# Patient Record
Sex: FEMALE | Race: WHITE | NOT HISPANIC OR LATINO | Employment: OTHER | URBAN - METROPOLITAN AREA
[De-identification: names, ages, dates, MRNs, and addresses within clinical notes are randomized per-mention and may not be internally consistent; named-entity substitution may affect disease eponyms.]

---

## 2017-01-15 ENCOUNTER — HOSPITAL ENCOUNTER (INPATIENT)
Facility: OTHER | Age: 72
LOS: 3 days | Discharge: HOME | DRG: 552 | End: 2017-01-20
Attending: INTERNAL MEDICINE | Admitting: INTERNAL MEDICINE
Payer: MEDICARE

## 2017-01-15 DIAGNOSIS — M47.27 OSTEOARTHRITIS OF SPINE WITH RADICULOPATHY, LUMBOSACRAL REGION: Primary | ICD-10-CM

## 2017-01-15 DIAGNOSIS — I15.2 HYPERTENSION ASSOCIATED WITH DIABETES: ICD-10-CM

## 2017-01-15 DIAGNOSIS — E83.42 HYPOMAGNESEMIA: ICD-10-CM

## 2017-01-15 DIAGNOSIS — R29.898 WEAKNESS OF RIGHT LOWER EXTREMITY: ICD-10-CM

## 2017-01-15 DIAGNOSIS — R53.1 WEAKNESS: ICD-10-CM

## 2017-01-15 DIAGNOSIS — E87.6 HYPOKALEMIA: ICD-10-CM

## 2017-01-15 DIAGNOSIS — E11.9 CONTROLLED TYPE 2 DIABETES MELLITUS WITHOUT COMPLICATION, WITHOUT LONG-TERM CURRENT USE OF INSULIN: ICD-10-CM

## 2017-01-15 DIAGNOSIS — F32.A DEPRESSION, UNSPECIFIED DEPRESSION TYPE: ICD-10-CM

## 2017-01-15 DIAGNOSIS — E11.59 HYPERTENSION ASSOCIATED WITH DIABETES: ICD-10-CM

## 2017-01-15 DIAGNOSIS — E83.39 HYPOPHOSPHATEMIA: ICD-10-CM
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
## 2017-01-15 LAB — POCT GLUCOSE: 114 MG/DL (ref 70–110)

## 2017-01-15 PROCEDURE — 25000003 PHARM REV CODE 250: Performed by: INTERNAL MEDICINE

## 2017-01-15 PROCEDURE — G0378 HOSPITAL OBSERVATION PER HR: HCPCS

## 2017-01-15 PROCEDURE — G0379 DIRECT REFER HOSPITAL OBSERV: HCPCS

## 2017-01-15 PROCEDURE — 63600175 PHARM REV CODE 636 W HCPCS: Performed by: INTERNAL MEDICINE

## 2017-01-15 RX ORDER — DULOXETIN HYDROCHLORIDE 20 MG/1
20 CAPSULE, DELAYED RELEASE ORAL 2 TIMES DAILY
Status: DISCONTINUED | OUTPATIENT
Start: 2017-01-15 | End: 2017-01-20 | Stop reason: HOSPADM

## 2017-01-15 RX ORDER — ONDANSETRON 2 MG/ML
4 INJECTION INTRAMUSCULAR; INTRAVENOUS EVERY 12 HOURS PRN
Status: DISCONTINUED | OUTPATIENT
Start: 2017-01-15 | End: 2017-01-20 | Stop reason: HOSPADM

## 2017-01-15 RX ORDER — HYDROCODONE BITARTRATE AND ACETAMINOPHEN 5; 325 MG/1; MG/1
1 TABLET ORAL EVERY 6 HOURS PRN
Status: DISCONTINUED | OUTPATIENT
Start: 2017-01-15 | End: 2017-01-20 | Stop reason: HOSPADM

## 2017-01-15 RX ORDER — POTASSIUM CHLORIDE 20 MEQ/1
20 TABLET, EXTENDED RELEASE ORAL
Status: COMPLETED | OUTPATIENT
Start: 2017-01-16 | End: 2017-01-16

## 2017-01-15 RX ORDER — SODIUM,POTASSIUM PHOSPHATES 280-250MG
1 POWDER IN PACKET (EA) ORAL
Status: DISCONTINUED | OUTPATIENT
Start: 2017-01-16 | End: 2017-01-18

## 2017-01-15 RX ORDER — GLUCAGON 1 MG
1 KIT INJECTION
Status: DISCONTINUED | OUTPATIENT
Start: 2017-01-15 | End: 2017-01-20 | Stop reason: HOSPADM

## 2017-01-15 RX ORDER — IBUPROFEN 200 MG
16 TABLET ORAL
Status: DISCONTINUED | OUTPATIENT
Start: 2017-01-15 | End: 2017-01-20 | Stop reason: HOSPADM

## 2017-01-15 RX ORDER — PANTOPRAZOLE SODIUM 40 MG/1
40 TABLET, DELAYED RELEASE ORAL DAILY
Status: DISCONTINUED | OUTPATIENT
Start: 2017-01-16 | End: 2017-01-20 | Stop reason: HOSPADM

## 2017-01-15 RX ORDER — INSULIN ASPART 100 [IU]/ML
0-5 INJECTION, SOLUTION INTRAVENOUS; SUBCUTANEOUS
Status: DISCONTINUED | OUTPATIENT
Start: 2017-01-15 | End: 2017-01-20 | Stop reason: HOSPADM

## 2017-01-15 RX ORDER — MAGNESIUM SULFATE HEPTAHYDRATE 40 MG/ML
2 INJECTION, SOLUTION INTRAVENOUS ONCE
Status: COMPLETED | OUTPATIENT
Start: 2017-01-15 | End: 2017-01-16

## 2017-01-15 RX ORDER — IBUPROFEN 200 MG
24 TABLET ORAL
Status: DISCONTINUED | OUTPATIENT
Start: 2017-01-15 | End: 2017-01-20 | Stop reason: HOSPADM

## 2017-01-15 RX ORDER — AMLODIPINE BESYLATE 5 MG/1
10 TABLET ORAL DAILY
Status: DISCONTINUED | OUTPATIENT
Start: 2017-01-16 | End: 2017-01-20 | Stop reason: HOSPADM

## 2017-01-15 RX ORDER — ENOXAPARIN SODIUM 100 MG/ML
40 INJECTION SUBCUTANEOUS EVERY 24 HOURS
Status: DISCONTINUED | OUTPATIENT
Start: 2017-01-16 | End: 2017-01-20 | Stop reason: HOSPADM

## 2017-01-15 RX ADMIN — HYDROCODONE BITARTRATE AND ACETAMINOPHEN 1 TABLET: 5; 325 TABLET ORAL at 10:01

## 2017-01-15 RX ADMIN — POTASSIUM CHLORIDE 20 MEQ: 1500 TABLET, EXTENDED RELEASE ORAL at 11:01

## 2017-01-15 RX ADMIN — MAGNESIUM SULFATE IN WATER 2 G: 40 INJECTION, SOLUTION INTRAVENOUS at 10:01

## 2017-01-15 RX ADMIN — DULOXETINE HYDROCHLORIDE 20 MG: 20 CAPSULE, DELAYED RELEASE ORAL at 10:01

## 2017-01-16 PROBLEM — E83.39 HYPOPHOSPHATEMIA: Status: ACTIVE | Noted: 2017-01-16

## 2017-01-16 PROBLEM — E87.6 HYPOKALEMIA: Status: ACTIVE | Noted: 2017-01-16

## 2017-01-16 PROBLEM — E83.42 HYPOMAGNESEMIA: Status: ACTIVE | Noted: 2017-01-16
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
## 2017-01-16 LAB
ALBUMIN SERPL BCP-MCNC: 2.7 G/DL
ALBUMIN SERPL BCP-MCNC: 2.8 G/DL
ALP SERPL-CCNC: 79 U/L
ALP SERPL-CCNC: 85 U/L
ALT SERPL W/O P-5'-P-CCNC: 12 U/L
ALT SERPL W/O P-5'-P-CCNC: 8 U/L
AMPHET+METHAMPHET UR QL: NEGATIVE
ANION GAP SERPL CALC-SCNC: 11 MMOL/L
ANION GAP SERPL CALC-SCNC: 9 MMOL/L
AST SERPL-CCNC: 12 U/L
AST SERPL-CCNC: 16 U/L
BARBITURATES UR QL SCN>200 NG/ML: NORMAL
BASOPHILS # BLD AUTO: 0.01 K/UL
BASOPHILS # BLD AUTO: 0.01 K/UL
BASOPHILS NFR BLD: 0.2 %
BASOPHILS NFR BLD: 0.2 %
BENZODIAZ UR QL SCN>200 NG/ML: NORMAL
BILIRUB SERPL-MCNC: 0.4 MG/DL
BILIRUB SERPL-MCNC: 0.4 MG/DL
BILIRUB UR QL STRIP: NEGATIVE
BUN SERPL-MCNC: 10 MG/DL
BUN SERPL-MCNC: 8 MG/DL
BZE UR QL SCN: NEGATIVE
CALCIUM SERPL-MCNC: 8.6 MG/DL
CALCIUM SERPL-MCNC: 9.3 MG/DL
CANNABINOIDS UR QL SCN: NEGATIVE
CHLORIDE SERPL-SCNC: 109 MMOL/L
CHLORIDE SERPL-SCNC: 110 MMOL/L
CHOLEST/HDLC SERPL: 4.2 {RATIO}
CLARITY UR: CLEAR
CO2 SERPL-SCNC: 22 MMOL/L
CO2 SERPL-SCNC: 22 MMOL/L
COLOR UR: YELLOW
CREAT SERPL-MCNC: 0.7 MG/DL
CREAT SERPL-MCNC: 0.7 MG/DL
CREAT UR-MCNC: 43.3 MG/DL
CRP SERPL-MCNC: 15 MG/L
DIFFERENTIAL METHOD: ABNORMAL
DIFFERENTIAL METHOD: ABNORMAL
EOSINOPHIL # BLD AUTO: 0.1 K/UL
EOSINOPHIL # BLD AUTO: 0.1 K/UL
EOSINOPHIL NFR BLD: 0.8 %
EOSINOPHIL NFR BLD: 1.3 %
ERYTHROCYTE [DISTWIDTH] IN BLOOD BY AUTOMATED COUNT: 12.8 %
ERYTHROCYTE [DISTWIDTH] IN BLOOD BY AUTOMATED COUNT: 12.8 %
ERYTHROCYTE [SEDIMENTATION RATE] IN BLOOD BY WESTERGREN METHOD: 27 MM/HR
EST. GFR  (AFRICAN AMERICAN): >60 ML/MIN/1.73 M^2
EST. GFR  (AFRICAN AMERICAN): >60 ML/MIN/1.73 M^2
EST. GFR  (NON AFRICAN AMERICAN): >60 ML/MIN/1.73 M^2
EST. GFR  (NON AFRICAN AMERICAN): >60 ML/MIN/1.73 M^2
ETHANOL UR-MCNC: <10 MG/DL
FOLATE SERPL-MCNC: 6.2 NG/ML
GLUCOSE SERPL-MCNC: 108 MG/DL
GLUCOSE SERPL-MCNC: 92 MG/DL
GLUCOSE UR QL STRIP: NEGATIVE
HCT VFR BLD AUTO: 35.2 %
HCT VFR BLD AUTO: 35.8 %
HDL/CHOLESTEROL RATIO: 23.9 %
HDLC SERPL-MCNC: 197 MG/DL
HDLC SERPL-MCNC: 47 MG/DL
HGB BLD-MCNC: 11.5 G/DL
HGB BLD-MCNC: 11.8 G/DL
HGB UR QL STRIP: NEGATIVE
KETONES UR QL STRIP: NEGATIVE
LDLC SERPL CALC-MCNC: 121.4 MG/DL
LEUKOCYTE ESTERASE UR QL STRIP: NEGATIVE
LYMPHOCYTES # BLD AUTO: 1.6 K/UL
LYMPHOCYTES # BLD AUTO: 2 K/UL
LYMPHOCYTES NFR BLD: 25.8 %
LYMPHOCYTES NFR BLD: 32.4 %
MAGNESIUM SERPL-MCNC: 1.9 MG/DL
MAGNESIUM SERPL-MCNC: 2 MG/DL
MCH RBC QN AUTO: 29.8 PG
MCH RBC QN AUTO: 30.1 PG
MCHC RBC AUTO-ENTMCNC: 32.7 %
MCHC RBC AUTO-ENTMCNC: 33 %
MCV RBC AUTO: 91 FL
MCV RBC AUTO: 91 FL
METHADONE UR QL SCN>300 NG/ML: NEGATIVE
MONOCYTES # BLD AUTO: 0.8 K/UL
MONOCYTES # BLD AUTO: 0.9 K/UL
MONOCYTES NFR BLD: 12.7 %
MONOCYTES NFR BLD: 15.5 %
NEUTROPHILS # BLD AUTO: 3.1 K/UL
NEUTROPHILS # BLD AUTO: 3.7 K/UL
NEUTROPHILS NFR BLD: 50.1 %
NEUTROPHILS NFR BLD: 60 %
NITRITE UR QL STRIP: NEGATIVE
NONHDLC SERPL-MCNC: 150 MG/DL
OPIATES UR QL SCN: NORMAL
PCP UR QL SCN>25 NG/ML: NEGATIVE
PH UR STRIP: 7 [PH] (ref 5–8)
PHOSPHATE SERPL-MCNC: 2.1 MG/DL
PLATELET # BLD AUTO: 296 K/UL
PLATELET # BLD AUTO: 309 K/UL
PMV BLD AUTO: 10.2 FL
PMV BLD AUTO: 10.3 FL
POCT GLUCOSE: 103 MG/DL (ref 70–110)
POCT GLUCOSE: 107 MG/DL (ref 70–110)
POCT GLUCOSE: 109 MG/DL (ref 70–110)
POCT GLUCOSE: 112 MG/DL (ref 70–110)
POTASSIUM SERPL-SCNC: 3.5 MMOL/L
POTASSIUM SERPL-SCNC: 3.7 MMOL/L
PROT SERPL-MCNC: 5.7 G/DL
PROT SERPL-MCNC: 6 G/DL
PROT UR QL STRIP: NEGATIVE
RBC # BLD AUTO: 3.86 M/UL
RBC # BLD AUTO: 3.92 M/UL
RPR SER QL: NORMAL
SODIUM SERPL-SCNC: 141 MMOL/L
SODIUM SERPL-SCNC: 142 MMOL/L
SP GR UR STRIP: 1.01 (ref 1–1.03)
TOXICOLOGY INFORMATION: NORMAL
TRIGL SERPL-MCNC: 143 MG/DL
TSH SERPL DL<=0.005 MIU/L-ACNC: 0.64 UIU/ML
URN SPEC COLLECT METH UR: NORMAL
UROBILINOGEN UR STRIP-ACNC: NEGATIVE EU/DL
VIT B12 SERPL-MCNC: 912 PG/ML
WBC # BLD AUTO: 6.08 K/UL
WBC # BLD AUTO: 6.21 K/UL

## 2017-01-16 PROCEDURE — 86592 SYPHILIS TEST NON-TREP QUAL: CPT

## 2017-01-16 PROCEDURE — 99220 PR INITIAL OBSERVATION CARE,LEVL III: CPT | Mod: ,,, | Performed by: HOSPITALIST

## 2017-01-16 PROCEDURE — G0378 HOSPITAL OBSERVATION PER HR: HCPCS

## 2017-01-16 PROCEDURE — 80053 COMPREHEN METABOLIC PANEL: CPT

## 2017-01-16 PROCEDURE — 82607 VITAMIN B-12: CPT

## 2017-01-16 PROCEDURE — 83735 ASSAY OF MAGNESIUM: CPT | Mod: 91

## 2017-01-16 PROCEDURE — 85651 RBC SED RATE NONAUTOMATED: CPT

## 2017-01-16 PROCEDURE — 25000003 PHARM REV CODE 250: Performed by: INTERNAL MEDICINE

## 2017-01-16 PROCEDURE — 86140 C-REACTIVE PROTEIN: CPT

## 2017-01-16 PROCEDURE — G8988 SELF CARE GOAL STATUS: HCPCS | Mod: CJ

## 2017-01-16 PROCEDURE — 63600175 PHARM REV CODE 636 W HCPCS: Performed by: INTERNAL MEDICINE

## 2017-01-16 PROCEDURE — A9585 GADOBUTROL INJECTION: HCPCS | Performed by: HOSPITALIST

## 2017-01-16 PROCEDURE — 83036 HEMOGLOBIN GLYCOSYLATED A1C: CPT

## 2017-01-16 PROCEDURE — G8979 MOBILITY GOAL STATUS: HCPCS | Mod: CI

## 2017-01-16 PROCEDURE — 97161 PT EVAL LOW COMPLEX 20 MIN: CPT

## 2017-01-16 PROCEDURE — 97530 THERAPEUTIC ACTIVITIES: CPT

## 2017-01-16 PROCEDURE — 84443 ASSAY THYROID STIM HORMONE: CPT

## 2017-01-16 PROCEDURE — G8978 MOBILITY CURRENT STATUS: HCPCS | Mod: CK

## 2017-01-16 PROCEDURE — 97165 OT EVAL LOW COMPLEX 30 MIN: CPT

## 2017-01-16 PROCEDURE — 36415 COLL VENOUS BLD VENIPUNCTURE: CPT

## 2017-01-16 PROCEDURE — 81003 URINALYSIS AUTO W/O SCOPE: CPT

## 2017-01-16 PROCEDURE — 85025 COMPLETE CBC W/AUTO DIFF WBC: CPT | Mod: 91

## 2017-01-16 PROCEDURE — 99204 OFFICE O/P NEW MOD 45 MIN: CPT | Mod: ,,, | Performed by: PSYCHIATRY & NEUROLOGY

## 2017-01-16 PROCEDURE — 82652 VIT D 1 25-DIHYDROXY: CPT

## 2017-01-16 PROCEDURE — 80061 LIPID PANEL: CPT

## 2017-01-16 PROCEDURE — 80307 DRUG TEST PRSMV CHEM ANLYZR: CPT

## 2017-01-16 PROCEDURE — 84100 ASSAY OF PHOSPHORUS: CPT

## 2017-01-16 PROCEDURE — 80053 COMPREHEN METABOLIC PANEL: CPT | Mod: 91

## 2017-01-16 PROCEDURE — G8987 SELF CARE CURRENT STATUS: HCPCS | Mod: CK

## 2017-01-16 PROCEDURE — 82746 ASSAY OF FOLIC ACID SERUM: CPT

## 2017-01-16 PROCEDURE — 83735 ASSAY OF MAGNESIUM: CPT

## 2017-01-16 PROCEDURE — 25500020 PHARM REV CODE 255: Performed by: HOSPITALIST

## 2017-01-16 RX ORDER — GADOBUTROL 604.72 MG/ML
7.5 INJECTION INTRAVENOUS
Status: COMPLETED | OUTPATIENT
Start: 2017-01-16 | End: 2017-01-16

## 2017-01-16 RX ADMIN — ENOXAPARIN SODIUM 40 MG: 100 INJECTION SUBCUTANEOUS at 11:01

## 2017-01-16 RX ADMIN — HYDROCODONE BITARTRATE AND ACETAMINOPHEN 1 TABLET: 5; 325 TABLET ORAL at 11:01

## 2017-01-16 RX ADMIN — POTASSIUM & SODIUM PHOSPHATES POWDER PACK 280-160-250 MG 1 PACKET: 280-160-250 PACK at 06:01

## 2017-01-16 RX ADMIN — DULOXETINE HYDROCHLORIDE 20 MG: 20 CAPSULE, DELAYED RELEASE ORAL at 08:01

## 2017-01-16 RX ADMIN — HYDROCODONE BITARTRATE AND ACETAMINOPHEN 1 TABLET: 5; 325 TABLET ORAL at 06:01

## 2017-01-16 RX ADMIN — HYDROCODONE BITARTRATE AND ACETAMINOPHEN 1 TABLET: 5; 325 TABLET ORAL at 10:01

## 2017-01-16 RX ADMIN — POTASSIUM & SODIUM PHOSPHATES POWDER PACK 280-160-250 MG 1 PACKET: 280-160-250 PACK at 10:01

## 2017-01-16 RX ADMIN — POTASSIUM & SODIUM PHOSPHATES POWDER PACK 280-160-250 MG 1 PACKET: 280-160-250 PACK at 11:01

## 2017-01-16 RX ADMIN — PANTOPRAZOLE SODIUM 40 MG: 40 TABLET, DELAYED RELEASE ORAL at 08:01

## 2017-01-16 RX ADMIN — HYDROCODONE BITARTRATE AND ACETAMINOPHEN 1 TABLET: 5; 325 TABLET ORAL at 05:01

## 2017-01-16 RX ADMIN — POTASSIUM & SODIUM PHOSPHATES POWDER PACK 280-160-250 MG 1 PACKET: 280-160-250 PACK at 08:01

## 2017-01-16 RX ADMIN — GADOBUTROL 7.5 ML: 604.72 INJECTION INTRAVENOUS at 05:01

## 2017-01-16 RX ADMIN — POTASSIUM CHLORIDE 20 MEQ: 1500 TABLET, EXTENDED RELEASE ORAL at 03:01

## 2017-01-16 RX ADMIN — AMLODIPINE BESYLATE 10 MG: 5 TABLET ORAL at 08:01

## 2017-01-16 RX ADMIN — DULOXETINE HYDROCHLORIDE 20 MG: 20 CAPSULE, DELAYED RELEASE ORAL at 10:01

## 2017-01-16 NOTE — PLAN OF CARE
Problem: Occupational Therapy Goal  Goal: Occupational Therapy Goal  Goals to be met by: 01/21/17     Patient will increase functional independence with ADLs by performing:    LE Dressing with Supervision and RW, PRN.  Grooming while standing at sink with Supervision and RW, PRN.  Toileting from toilet with Supervision and RW, PRN, for hygiene and clothing management.   Stand pivot transfers with Supervision, RW PRN.  Toilet transfer to toilet with Supervision and RW, PRN.  Outcome: Ongoing (interventions implemented as appropriate)  Initial OT eval completed and treatment to follow    Comments:   BARB Jara, 1/16/2017

## 2017-01-16 NOTE — PLAN OF CARE
Problem: Physical Therapy Goal  Goal: Physical Therapy Goal  Goals to be met by: 17     Patient will increase functional independence with mobility by performin. Supine to sit with Modified [place]  2. Sit to supine with Modified [place]  3. Sit to stand transfer with Modified [place]  4. Gait x 150 feet with Modified [place] using Rolling Walker  5. Ascend and descend 3 steps with SPC and HHA/ CGA .   Outcome: Ongoing (interventions implemented as appropriate)  Pt eval.  Tried pt with both RW and SPC-unsafe with cane/needs A.  R leg weakness mostly hip lfex as compared to L.    REC:  OP therapy  DME:  RW, refuses BSC or 3 in 1.

## 2017-01-16 NOTE — ASSESSMENT & PLAN NOTE
Clinical history and examination and workup done was consistent with right leg problems related to lumbar spine disease and associated radiculopathy.  The patient has chronic pain.  Recommendations: Patient is to follow-up with orthopedic surgery, who has been her treating physician for the back problems.  She might benefit from getting an EMG/NCS as an outpatient however would defer this to her orthopedic surgeon.  Discussed fall precautions with patient.

## 2017-01-16 NOTE — PROGRESS NOTES
Progress Note  Hospital Medicine    Admit Date: 1/15/2017   LOS: 0 days     SUBJECTIVE:     Follow-up For:  Weakness    Interval History:    Pt new to me.  72 yo female transferred from [facility] for weakness and neurology evaluation.   Recently discharged 12/25/16 from [facility] wher MR imaging of head and neck appeared on okay.  Did not qualify for SNF then, and noted to be requesting narcotic pain medication.      States she feels okay currently.  Her orthopedist is Dr. Gurinder López and he gives her spinal injections which help and she had one on 12/5 and 1/5.  States she has a history of spinal stenosis in cervical area and further down.  States walks without cane.  States her main complaint is RLE giving out causing her to fall.  States no long takes clonazepam.      Review of Systems:  Constitutional: No fever or chills  Respiratory: No cough or shorness of breath  Cardiovascular: No chest pain or palpitations  Gastrointestinal: No nausea or vomiting  Neurological: No confusion or weakness    OBJECTIVE:     Vital Signs Range (Last 24H):  Vitals:    01/16/17 1200   BP:    Pulse: 74   Resp:    Temp:        Temp:  [97.4 °F (36.3 °C)-97.7 °F (36.5 °C)]   Pulse:  [57-77]   Resp:  [18]   BP: (133-148)/(61-67)   SpO2:  [95 %-96 %]     I & O (Last 24H):    Intake/Output Summary (Last 24 hours) at 01/16/17 1343  Last data filed at 01/16/17 0300   Gross per 24 hour   Intake                0 ml   Output              200 ml   Net             -200 ml       Physical Exam:  General appearance: Calm, NAD  Eyes:  Conjunctivae/corneas clear. PERRL.  Lungs: Normal respiratory effort,   clear to auscultation bilaterally   Heart: Regular rate and rhythm, S1, S2 normal,  Abdomen: Soft, non-tender non-distended; bowel sounds normal;   Extremities: No cyanosis or clubbing. No edema.    Skin: Skin color, texture, turgor normal. No rashes or lesions  Neurologic:  No focal numbness or weakness.  Gait fair, a little unsteady.       Laboratory Data:  Reviewed and noted  where applicable- Please see chart for full lab data.    Medications:  Medication list was reviewed and changes noted under Assessment/Plan.    Diagnostic Results:  No new        MRI BRAIN W WO 12/21/16:  Mild involutional changes and findings consistent mild microvascular ischemic change not appearing unusual for the patient's age.      MRA BRAIN WO AND NECK W 12/21/16:  There is no significant central intracranial stenosis.  Evaluation of small peripheral vessels is limited.  No intracranial aneurysm or vascular malformation is evident.  The great vessels arise in normal sequence from the arch without significant proximal stenosis.  Vertebral arteries are patent bilaterally without significant stenosis.  Right carotid bifurcation in neck; smooth with less than 20 percent diameter stenosis.  Left carotid bifurcation in neck; smooth with less than 20 percent diameter stenosis.      ASSESSMENT/PLAN:     Active Hospital Problems    Diagnosis  POA    *Weakness [R53.1]  Yes    Falls [W19.XXXA]  Yes    Hypokalemia [E87.6]  Yes    Hypomagnesemia [E83.42]  Yes    Hypophosphatemia [E83.39]  Yes    Weakness of right lower extremity [R29.898]  Yes    Depression [F32.9]  Yes    Controlled type 2 diabetes mellitus without complication [E11.9]  Yes    Hypertension associated with diabetes [E11.59, I10]  Yes      Resolved Hospital Problems    Diagnosis Date Resolved POA   No resolved problems to display.       RLE weakness  -pt with obvious deficit on ambulation. Will get RPR, vit   - B-12 and folate WNL.  Vit D pending.  - TSH = 0.635  - PT/OT   - Neurology to see   - Given recent injection in spine and subsequent weakness, increased suspicion of inflammatory process. Will get Spinal imaging in am. Pt with unilateral weakness and no evidence of ascending paralysis.   -esr, crp ordered   - Orthostatics neg at osh, will repeat      Hypomag, hypo phaos, hypo k  - Now  WNL     HTN  -cont home meds     DM-2  - On metformin only.   - HbA1c pending.   - SSI for now. Hold home metformin      Depression  - situation per patient. Will monitor.   - Cont Cymbalta.      PPx  -lovenox    Medically stable and dischargeable from medical standpoint.  I discussed avoiding excess narcotic medication, using only for severe pain, consider breaking tab in half if must use, and that is could contribute to falling.   Will await neurology to see patient since she was sent for this reason.

## 2017-01-16 NOTE — SUBJECTIVE & OBJECTIVE
Past Medical History   Diagnosis Date    Diabetes mellitus     Hiatal hernia     Hypertension     Stomach ulcer        Past Surgical History   Procedure Laterality Date    Cholecystectomy      Shoulder surgery       Titanium in shoulder       Review of patient's allergies indicates:   Allergen Reactions    Percocet [oxycodone-acetaminophen]     Ultram [tramadol]        Current Neurological Medications: none    No current facility-administered medications on file prior to encounter.      Current Outpatient Prescriptions on File Prior to Encounter   Medication Sig    amlodipine (NORVASC) 10 MG tablet Take 10 mg by mouth once daily.    clonazePAM (KLONOPIN) 1 MG tablet Take 1 mg by mouth daily as needed for Anxiety.    duloxetine (CYMBALTA) 20 MG capsule Take 1 capsule (20 mg total) by mouth 2 (two) times daily.    hydrocodone-acetaminophen 10-325mg (NORCO)  mg Tab Take 1 tablet by mouth every 4 (four) hours as needed for Pain.    metformin (GLUCOPHAGE-XR) 500 MG 24 hr tablet Take 500 mg by mouth daily with breakfast.    omeprazole (PRILOSEC) 40 MG capsule Take 40 mg by mouth once daily.     Family History     Problem Relation (Age of Onset)    COPD Father    Cancer Father    Heart disease Mother        Social History Main Topics    Smoking status: Never Smoker    Smokeless tobacco: Not on file    Alcohol use No    Drug use: No    Sexual activity: No     Review of Systems   Constitutional: Negative.    HENT: Negative.  Negative for hearing loss.    Eyes: Negative.  Negative for visual disturbance.   Respiratory: Negative.  Negative for shortness of breath.    Cardiovascular: Negative.  Negative for chest pain and palpitations.   Gastrointestinal: Negative.    Endocrine: Negative.    Genitourinary: Negative.    Musculoskeletal: Positive for arthralgias, back pain and gait problem. Negative for neck pain.   Skin: Negative.    Allergic/Immunologic: Negative.    Neurological: Negative for tremors,  seizures, syncope, speech difficulty, weakness, numbness and headaches.   Hematological: Negative.    Psychiatric/Behavioral: Negative.  Negative for confusion and decreased concentration.     Objective:     Vital Signs (Most Recent):  Temp: 97.7 °F (36.5 °C) (01/16/17 1145)  Pulse: 63 (01/16/17 1400)  Resp: 18 (01/16/17 1145)  BP: (!) 142/65 (01/16/17 1145)  SpO2: 95 % (01/16/17 1145) Vital Signs (24h Range):  Temp:  [97.4 °F (36.3 °C)-97.7 °F (36.5 °C)] 97.7 °F (36.5 °C)  Pulse:  [57-77] 63  Resp:  [18] 18  SpO2:  [95 %-96 %] 95 %  BP: (133-148)/(61-67) 142/65     Weight: 75.8 kg (167 lb 1.7 oz)  Body mass index is 26.17 kg/(m^2).    Physical Exam   Constitutional: She is oriented to person, place, and time. She appears well-developed and well-nourished.   HENT:   Head: Normocephalic and atraumatic.   Neck: Normal range of motion. Neck supple. Carotid bruit is not present.   Neurological: She is oriented to person, place, and time. She has a normal Finger-Nose-Finger Test.   Reflex Scores:       Tricep reflexes are 1+ on the right side and 1+ on the left side.       Bicep reflexes are 1+ on the right side and 1+ on the left side.       Brachioradialis reflexes are 1+ on the right side and 1+ on the left side.       Patellar reflexes are 1+ on the right side and 1+ on the left side.       Achilles reflexes are 1+ on the right side and 1+ on the left side.  Vitals reviewed.      NEUROLOGICAL EXAMINATION:     MENTAL STATUS   Oriented to person, place, and time.   Level of consciousness: alert  Knowledge: good.   Able to read. Able to repeat. Normal comprehension.     CRANIAL NERVES   Cranial nerves II through XII intact.     MOTOR EXAM     Strength   Strength 5/5 except as noted.        Bedside testing revealed good power in the lower extremities though with some clumsiness of fine motor in the right lower extremity.  Gait was not tested.  She did report that walking was difficult on the right because of low back  pain radiating down the right leg.     REFLEXES     Reflexes   Right brachioradialis: 1+  Left brachioradialis: 1+  Right biceps: 1+  Left biceps: 1+  Right triceps: 1+  Left triceps: 1+  Right patellar: 1+  Left patellar: 1+  Right achilles: 1+  Left achilles: 1+  Right plantar: normal  Left plantar: normal    SENSORY EXAM   Right arm light touch: normal  Left arm light touch: normal  Right leg light touch: normal  Left leg light touch: normal  Right leg proprioception: normal  Left leg proprioception: normal  Right arm pinprick: normal  Left arm pinprick: normal  Right leg pinprick: decreased from knee  Left leg pinprick: normal    GAIT AND COORDINATION      Coordination   Finger to nose coordination: normal    Tremor   Resting tremor: absent  Intention tremor: absent  Action tremor: absent       Gait not tested.  Noted PT/OT notes and evaluation specially regarding gait assessment       Significant Labs: All pertinent lab results from the past 24 hours have been reviewed.    Significant Imaging: Reviewed recent MRI/MRA of the brain done in December 2016 which did not show any acute intracranial abnormality or ischemic lesion.

## 2017-01-16 NOTE — H&P
[facility] Hospital Medicine History and Physical      Admitting Physician: Bill Jon MD  Attending Physician: Hospital Medicine Staff  PCP: Jonny    Date of Admit: 1/15/2017    Chief Complaint     Weakness and falls x1 month    Subjective:      History of Present Illness:  Abbey Alvarado is a 71 y.o.  female who  has a past medical history of Diabetes mellitus; Hiatal hernia; Hypertension; and Stomach ulcer.. The patient presented to [facility] on 1/15/2017 with a primary complaint of No chief complaint on file.      The patient was in their usual state of health until 1 month ago when she received and injection on Dec 5, 2016 in the lower spine for chronic back pain.  Pt reports significant improvement in her pain.  After that time, pt reports right lower ext weakness that has made ambulation difficult.  Pt can no longer complete many of her ADL's due to weakness and multiple falls causing injury.  She presented to hospital Dec 20, 2016 and was admitted with workup including MRI.  Workup neg so far.  Pt reports continued symptoms since that time.  Presented back to ED for recent falls.  Denies ha, cp, sob, n/v, abd pain.  Endorses back pain, right sided imbalance.     Past Medical History:  Past Medical History   Diagnosis Date    Diabetes mellitus     Hiatal hernia     Hypertension     Stomach ulcer        Past Surgical History:  Past Surgical History   Procedure Laterality Date    Cholecystectomy      Shoulder surgery       Titanium in shoulder       Allergies:  Review of patient's allergies indicates:   Allergen Reactions    Percocet [oxycodone-acetaminophen]     Ultram [tramadol]        Home Medications:  Prior to Admission medications    Medication Sig Start Date End Date Taking? Authorizing Provider   amlodipine (NORVASC) 10 MG tablet Take 10 mg by mouth once daily.    Historical Provider, MD   clonazePAM (KLONOPIN) 1 MG tablet Take 1 mg by mouth daily as  "needed for Anxiety.    Historical Provider, MD   duloxetine (CYMBALTA) 20 MG capsule Take 1 capsule (20 mg total) by mouth 2 (two) times daily. 16  Agustina Pinto MD   hydrocodone-acetaminophen 10-325mg (NORCO)  mg Tab Take 1 tablet by mouth every 4 (four) hours as needed for Pain.    Historical Provider, MD   metformin (GLUCOPHAGE-XR) 500 MG 24 hr tablet Take 500 mg by mouth daily with breakfast.    Historical Provider, MD   omeprazole (PRILOSEC) 40 MG capsule Take 40 mg by mouth once daily.    Historical Provider, MD       Family History:  Family History   Problem Relation Age of Onset    Heart disease Mother     Cancer Father     COPD Father        Social History:  Social History   Substance Use Topics    Smoking status: Never Smoker    Smokeless tobacco: None    Alcohol use No       Review of Systems:  Pertinent items are noted in HPI.   Constitutional: No fever or chills, no weight changes.  Eyes: No visual changes or photophobia  HEENT: No nasal congestion or sore throat  Respiratory: No cough or shorness of breath  Cardiovascular: No chest pain or palpitations  Gastrointestinal: No nausea or vomiting, no diarrhea or change in bowel habits  Genitourinary: No hematuria or dysuria  Musculoskeletal: No myalgias +weakness   Skin: No rash or pruritis  Hematologic/lymphatic: No easy bruising, bleeding or lymphadenopathy  Neurologic: no seizures or tremors  Behavioral/Psych: no auditory or visual hallucinations  Endocrine: no heat or cold intolerance  All other systems are reviewed and are negative.       Objective:   Last 24 Hour Vital Signs:  BP  Min: 133/61  Max: 133/61  Temp  Av.5 °F (36.4 °C)  Min: 97.5 °F (36.4 °C)  Max: 97.5 °F (36.4 °C)  Pulse  Av.5  Min: 65  Max: 66  Resp  Av  Min: 18  Max: 18  SpO2  Av %  Min: 96 %  Max: 96 %  Height  Av' 7" (170.2 cm)  Min: 5' 7" (170.2 cm)  Max: 5' 7" (170.2 cm)  Weight  Av.7 kg (169 lb)  Min: 76.7 kg (169 lb)  Max: " 76.7 kg (169 lb)  Body mass index is 26.47 kg/(m^2).       Physical Examination:  GEN: AAOx3, nad  Head: atraumatic, normocephalic  EEN: eomi, perrl, op clear, mmm  T: neck supple, JVP ~8   Lungs: CTA b, no wheezes, or rhonchi, no accessory muscle use  Heart: rrr, no mrg  Abd: s, ntnd +bs   Ext: no c/c/e, capillary refill ~2 sec  Lymph: no cervical, axillary, inguinal LAD  Skin: no rashes or lesions  Psych: normal mood and affect  Neuro:   CN II-XII intact  Strength 5/5 upper and lower  Reflexes- 2+ right upper, lower, left upper, 1+ left lower.  downgoing toes bilateral  rhomber neg, poor disdiadokinesis   Gait: unable to step forward with right leg without significant direction.        Laboratory:  Most Recent Data:  CBC:   Lab Results   Component Value Date    WBC 11.00 12/20/2016    HGB 13.5 12/20/2016    HCT 41.0 12/20/2016     12/20/2016    MCV 90 12/20/2016    RDW 13.4 12/20/2016     BMP:   Lab Results   Component Value Date     12/20/2016    K 3.4 (L) 12/20/2016     12/20/2016    CO2 25 12/20/2016    BUN 8 12/20/2016    CREATININE 0.7 12/20/2016    GLU 97 12/20/2016    CALCIUM 8.8 12/20/2016    MG 2.1 12/20/2016     LFTs:   Lab Results   Component Value Date    PROT 6.8 12/20/2016    ALBUMIN 3.3 (L) 12/20/2016    BILITOT 0.2 12/20/2016    AST 21 12/20/2016    ALKPHOS 90 12/20/2016    ALT 32 12/20/2016     Coags:   Lab Results   Component Value Date    INR 0.9 12/20/2016     FLP: No results found for: CHOL, HDL, LDLCALC, TRIG, CHOLHDL  DM:   Lab Results   Component Value Date    CREATININE 0.7 12/20/2016     Thyroid: No results found for: TSH, FREET4, W7HIRJG, D9FKLBN, THYROIDAB  Anemia: No results found for: IRON, TIBC, FERRITIN, PYVDGWFW13, FOLATE  Cardiac:   Lab Results   Component Value Date    TROPONINI <0.006 12/24/2016     Urinalysis:   Lab Results   Component Value Date    LABURIN  10/19/2014     Multiple organisms isolated. None in predominance.  Repeat if    LABURIN clinically  necessary. 10/19/2014    COLORU Yellow 12/20/2016    SPECGRAV 1.015 12/20/2016    NITRITE Negative 12/20/2016    KETONESU Negative 12/20/2016    UROBILINOGEN Negative 12/20/2016    WBCUA 10 (H) 12/20/2016       Trended Lab Data:  No results for input(s): WBC, HGB, HCT, PLT, MCV, RDW, NA, K, CL, CO2, BUN, CREATININE, GLU, PROT, ALBUMIN, BILITOT, AST, ALKPHOS, ALT in the last 168 hours.    Trended Cardiac Data:  No results for input(s): TROPONINI, CKTOTAL, CKMB, BNP in the last 168 hours.    Other Results:  EKG (my interpretation): normal EKG, normal sinus rhythm. (OSH study)    Radiology:  Imaging Results     None           Assessment:     Abbey Alvarado is a 71 y.o. female with:  Patient Active Problem List    Diagnosis Date Noted    Weakness 01/15/2017    Weakness of right lower extremity 01/15/2017    Depression 01/15/2017    Chest pain at rest 12/23/2016    Laceration of head 12/21/2016    Peptic ulcer disease 12/21/2016    Hypertension associated with diabetes 12/21/2016    Controlled type 2 diabetes mellitus without complication 12/21/2016        Plan:     RLE weakness  -pt with obvious deficit on ambulation.   Will get RPR, vit b12, folate, vit d  -PT/OT in am  -neuro consult   -given recent injection in spine and subsequent weakness, increased suspicion of inflammatory process.  Will get Spinal imaging in am.  Pt with unilateral weakness and no evidence of ascending paralysis.    -esr, crp ordered   -orthostatics neg at osh, will repeat     Hypomag, hypo phaos, hypo k  -replace po or IV as needed.  -repeat electrolytes now    HTN  -cont home meds    DM  -on meftormin only.  Will get HbA1c.  SSI for now.  Hold home metformin     Depression  -situation per patient.  Will monitor.  Cont symbalta     PPx  -lovenox      Code Status:     Full     Bill Jon MD  [facility]  SpectraLink: [id number]  Pager: [phone]

## 2017-01-16 NOTE — PLAN OF CARE
01/16/17 1508   KYLE Message   Medicare Outpatient and Observation Notification regarding financial responsibility Given to patient/caregiver;Explained to patient/caregiver;Signed/date by patient/caregiver   Date KYLE was signed 01/16/17   Time KYLE was signed 1502

## 2017-01-16 NOTE — PLAN OF CARE
Discharge Planning:    Chart reviewed today    Plan of care discussed with patient at bedside today. Patient confirmed identifying information. Patient states she lives at home with her , family and grandchildren live next door. Patient has a rollator at home, shower chair, and grab bar. When asked patient about financial concerns regarding medication or healthcare she denied.     Patient also informed of observation status,MOON signed.     Patient has a preference for [facility] in [place], [place] if doctor recommends it.     Case management to follow and remain supportive.       01/16/17 8597   Discharge Assessment   Assessment Type Discharge Planning Assessment   Confirmed/corrected address and phone number on facesheet? Yes   Assessment information obtained from? Patient   Prior to hospitilization cognitive status: Alert/Oriented   Prior to hospitalization functional status: Independent;Assistive Equipment   Current cognitive status: Alert/Oriented   Current Functional Status: Independent;Assistive Equipment   Arrived From admitted as an inpatient   Lives With spouse   Able to Return to Prior Arrangements yes   Is patient able to care for self after discharge? Unable to determine at this time (comments)   How many people do you have in your home that can help with your care after discharge? 1   Who are your caregiver(s) and their phone number(s)? Daniel Alvarado, , [phone]   Patient currently being followed by outpatient case management? No   Patient currently receives home health services? No   Does the patient currently use HME? Yes   Patient currently receives private duty nursing? No   Patient currently receives any other outside agency services? No   Equipment Currently Used at Home grab bar;shower chair   Do you have any problems affording any of your prescribed medications? No   Is the patient taking medications as prescribed? yes   Do you have any financial concerns preventing you from  receiving the healthcare you need? No   Does the patient have transportation to healthcare appointments? Yes   Transportation Available family or friend will provide   Discharge Plan A Home with family   Patient/Family In Agreement With Plan yes

## 2017-01-16 NOTE — PT/OT/SLP EVAL
"Occupational Therapy  Evaluation    Abbey Alvarado   MRN: [id number]   Admitting Diagnosis: Weakness    OT Date of Treatment: 01/16/17   OT Start Time: 0847  OT Stop Time: 0902  OT Total Time (min): 15 min    Billable Minutes:  Evaluation 15    Diagnosis: Weakness     Past Medical History   Diagnosis Date    Diabetes mellitus     Hiatal hernia     Hypertension     Stomach ulcer       Past Surgical History   Procedure Laterality Date    Cholecystectomy      Shoulder surgery       Titanium in shoulder       Referring physician: BRIAN Jon  Date referred to OT: 01/16/17    General Precautions: Standard, anti-coagulation medicine, diabetic, fall  Orthopedic Precautions: N/A  Braces: N/A    Do you have any cultural, spiritual, Baptism conflicts, given your current situation?: No     Patient History:  Living Environment  Lives With: spouse  Living Arrangements: house  Home Accessibility: stairs to enter home  Home Layout: Able to live on 1st floor  Number of Stairs to Enter Home: 3  Stair Railings at Home: none  Transportation Available: car, family or friend will provide  Living Environment Comment:  works  Equipment Currently Used at Home: shower chair, grab bar (owns rollator, but states that it is more for the community)    Prior level of function:   Bed Mobility/Transfers: independent (use rollator for community moreso)  Grooming: independent  Bathing: needs device (shower chair in tub/shower combo)  Upper Body Dressing: independent  Lower Body Dressing: independent  Toileting: needs device (grab bars)  Home Management Skills: needs assist  Driving License: Yes  Mode of Transportation: Car  Occupation: Retired  Leisure and Hobbies: "Nothing really...crafts when I'm in the mood"     Dominant hand: right    Subjective:  Communicated with nsg prior to session.  "I don't use the walker in the house"  Will defer AD and usage to PT  Chief Complaint: (R) sided rib pain and weakness  Patient/Family stated goals: " Home    Pain Ratin/10  Location - Side: Right     Location: rib(s)  Pain Addressed: Pre-medicate for activity, Reposition, Distraction  Pain Rating Post-Intervention: 8/10    Objective:  Patient found with: telemetry, peripheral IV    Cognitive Exam:  Oriented to: Person, Place and Situation  Follows Commands/attention: Follows two-step commands  Communication: clear/fluent  Memory:  No Deficits noted  Safety awareness/insight to disability: impaired  Coping skills/emotional control: Appropriate to situation    Visual/perceptual:  Impaired  acuity (difficulty maneuvering near walls)    Physical Exam:  Postural examination/scapula alignment: Rounded shoulder and Head forward  Skin integrity: Thin  Edema: None noted in (R) UE    Sensation:   Intact  light/touch (B) UEs    Upper Extremity Range of Motion:  Right Upper Extremity: WFL  Left Upper Extremity: WFL    Upper Extremity Strength:  Right Upper Extremity: WFL  Left Upper Extremity: WFL   Strength: Good    Fine motor coordination:   Intact    Gross motor coordination: Intact    Functional Mobility:  Bed Mobility:  Supine to Sit: Stand by Assistance, WIth side rail (from (L) sidelying)  Sit to Supine: Stand by Assistance, With side rail    Transfers:  Sit <> Stand Assistance: Stand By Assistance (cues for hand placement with RW)  Sit <> Stand Assistive Device: Rolling Walker  Bed <> Chair Technique:  (declined UIC secondary to fatigue)  Toilet Transfer Technique: Stand Pivot (after functional mobility from bed)  Toilet Transfer Assistance: Stand By Assistance  Toilet Transfer Assistive Device: Rolling Walker, grab bar    Functional Ambulation: CGA with RW within room    Activities of Daily Living:  Feeding Level of Assistance: Modified independent  UE Dressing Level of Assistance: Set-up Assistance  LE Dressing Level of Assistance: Supervision (socks)  Grooming Position: Standing at sink  Grooming Level of Assistance: Stand by assistance  Toileting Where  "Assessed: Toilet  Toileting Level of Assistance: Stand by assistance    Balance:   Static Sit: GOOD: Takes MODERATE challenges from all directions  Dynamic Sit: GOOD: Maintains balance through MODERATE excursions of active trunk movement  Static Stand: FAIR+: Takes MINIMAL challenges from all directions  Dynamic stand: FAIR: Needs CONTACT GUARD during gait    Therapeutic Activities and Exercises:  Educ to role of OT, POC.  Not receptive to SNF, as pt reports home alone with h/o falls and presently "holding onto things" as she maneuvers within the home.  Discussed HH therapy.    AM-PAC 6 CLICK ADL  How much help from another person does this patient currently need?  1 = Unable, Total/Dependent Assistance  2 = A lot, Maximum/Moderate Assistance  3 = A little, Minimum/Contact Guard/Supervision  4 = None, Modified [place]/Independent    Putting on and taking off regular lower body clothing? : 3  Bathing (including washing, rinsing, drying)?: 3  Toileting, which includes using toilet, bedpan, or urinal? : 3  Putting on and taking off regular upper body clothing?: 3  Taking care of personal grooming such as brushing teeth?: 3  Eating meals?: 4  Total Score: 19    AM-PAC Raw Score CMS "G-Code Modifier Level of Impairment Assistance   6 % Total / Unable   7 - 9 CM 80 - 100% Maximal Assist   10 - 14 CL 60 - 80% Moderate Assist   15 - 19 CK 40 - 60% Moderate Assist   20 - 22 CJ 20 - 40% Minimal Assist   23 CI 1-20% SBA / CGA   24 CH 0% Independent/ Mod I       Patient left HOB elevated with all lines intact, call button in reach and nsg notified    Assessment:  Abbey Alvarado is a 71 y.o. female with a medical diagnosis of Weakness and presents with impaired mobility and independence with ADL tasks as needed for safety within home.  Recommend OT services to assist patient in increasing strength and endurance for safe home functioning..    Rehab identified problem list/impairments: Rehab identified problem " list/impairments: weakness, impaired self care skills, impaired functional mobilty, gait instability, impaired balance, decreased lower extremity function, decreased safety awareness, pain    Rehab potential is good.    Activity tolerance: Fair (fatigued)    Discharge recommendations: Discharge Facility/Level Of Care Needs: home with home health, home health PT, home health OT (declined SNF )     Barriers to discharge: Barriers to Discharge: Inaccessible home environment, Decreased caregiver support    Equipment recommendations:  (declines  3-in-1, though recommended; will defer AD to PT)     GOALS:   Occupational Therapy Goals        Problem: Occupational Therapy Goal    Goal Priority Disciplines Outcome Interventions   Occupational Therapy Goal     OT, PT/OT Ongoing (interventions implemented as appropriate)    Description:  Goals to be met by: 01/21/17     Patient will increase functional independence with ADLs by performing:    LE Dressing with Supervision and RW, PRN.  Grooming while standing at sink with Supervision and RW, PRN.  Toileting from toilet with Supervision and RW, PRN, for hygiene and clothing management.   Stand pivot transfers with Supervision, RW PRN.  Toilet transfer to toilet with Supervision and RW, PRN.                PLAN:  Patient to be seen 5 x/week to address the above listed problems via self-care/home management, therapeutic activities, therapeutic exercises  Plan of Care expires: 02/13/17  Plan of Care reviewed with: patient         BARB Jara  01/16/2017

## 2017-01-16 NOTE — PROGRESS NOTES
"Pt claims she has had trouble with her vision field, calling them "spiders" since she was younger. She has since reported a history of cataracts in bilateral eyes. Surgery to remove cataracts in both eyes reported. kiara HERRERA, 3mm and round. Will continue to monitor.  "

## 2017-01-16 NOTE — NURSING
Urine specimen sent to lab for U/A and urine toxicology. Pt denies any problems upon urination.  Ambulating in room often. Noted with steady gait. Denies any complaints.

## 2017-01-16 NOTE — PT/OT/SLP EVAL
Physical Therapy  Evaluation/treatment  CHASTITY Alvarado   MRN: [id number]   Admitting Diagnosis: Weakness    PT Received On: 17  PT Start Time: 1229     PT Stop Time: 1303    PT Total Time (min): 34 min       Billable Minutes:  Evaluation 20 and Therapeutic Activity 14    Diagnosis: Weakness      Past Medical History   Diagnosis Date    Diabetes mellitus     Hiatal hernia     Hypertension     Stomach ulcer       Past Surgical History   Procedure Laterality Date    Cholecystectomy      Shoulder surgery       Titanium in shoulder       Referring physician: Dr. Tony Chirinos  Date referred to PT: 17    General Precautions: Standard, anti-coagulation medicine, diabetic, fall  Orthopedic Precautions: N/A   Braces:         Do you have any cultural, spiritual, Baptism conflicts, given your current situation?: no    Patient History:  Lives With: spouse  Living Arrangements: house  Home Accessibility: stairs to enter home  Home Layout: Able to live on 1st floor  Number of Stairs to Enter Home: 3  Stair Railings at Home: none  Transportation Available: car, family or friend will provide  Living Environment Comment: pt lives with  in 1 story house with 3 IRINA and no HR.   works 2 1/2 miles from house so pt is home alone during day.  pt uses rollator for community-nothing in house-hold on to furniture if feels weak.  pt has grab bar by toilet and tub shower combo.   does shopping and she does cook,  able to drive  Equipment Currently Used at Home: grab bar (tells PT she does not have SC or use one.  )  DME owned (not currently used): single point cane    Previous Level of Function:   see comment above      Subjective:  Communicated with nursign prior to session.    Chief Complaint: R rib pain and R leg weakness  Patient goals: decrease pain and get leg stronger     Pain Ratin/10   Location - Side: Right     Location: rib(s)  Pain Addressed: Pre-medicate for activity, Reposition,  Distraction  Pain Rating Post-Intervention: 6/10    Objective:   Patient found with: peripheral IV, telemetry     Cognitive Exam:  Oriented to: Person, Place and Situation    Follows Commands/attention: Follows two-step commands  Communication: clear/fluent  Safety awareness/insight to disability: impaired    Physical Exam:  Postural examination/scapula alignment: Rounded shoulder    Skin integrity: Visible skin intact  Edema: None noted     Sensation:   Neuropathy with tingling in B toes otherwise intact to light touch    Upper Extremity - see OT eval    Lower Extremity Range of Motion:  Right Lower Extremity: WFL  Left Lower Extremity: WFL    Lower Extremity Strength:  Right Lower Extremity: Deficits: hip flex 3-. quad4-, ankle 4-  Left Lower Extremity: WFL      Gross motor coordination: impaired on R as compared to L    Functional Mobility:  Bed Mobility:  Supine to Sit: Stand by Assistance  Sit to Supine: Stand by Assistance    Transfers:  Sit <> Stand Assistance: Stand By Assistance  Sit <> Stand Assistive Device: Rolling Walker    Gait:   Gait Distance: 100' with RW CGA to SBA, 100' with SPC and min to CGA with cues for sequence  Assistance 1: Stand by Assistance, Contact Guard Assistance, Minimum assistance  Gait Assistive Device: Rolling walker, Single point cane  Gait Pattern: reciprocal  Gait Deviation(s): decreased christian, decreased swing-to-stance ratio    Stairs:  Not assessed    Balance:   Static Sit: GOOD: Takes MODERATE challenges from all directions  Dynamic Sit: GOOD-: Maintains balance through MODERATE excursions of active trunk movement,     Static Stand: FAIR+: Takes MINIMAL challenges from all directions  Dynamic stand: FAIR: Needs CONTACT GUARD during gait    Therapeutic Activities and Exercises:  Discussed safety with pt-also return to supine but instructed in postioning in bed for back care-pt needs to be up high in bed, not slouched down that would cause back and rib pain.     AM-PAC 6  CLICK MOBILITY  How much help from another person does this patient currently need?   1 = Unable, Total/Dependent Assistance  2 = A lot, Maximum/Moderate Assistance  3 = A little, Minimum/Contact Guard/Supervision  4 = None, Modified [place]/Independent    Turning over in bed (including adjusting bedclothes, sheets and blankets)?: 3  Sitting down on and standing up from a chair with arms (e.g., wheelchair, bedside commode, etc.): 3  Moving from lying on back to sitting on the side of the bed?: 3  Moving to and from a bed to a chair (including a wheelchair)?: 3  Need to walk in hospital room?: 3  Climbing 3-5 steps with a railing?: 3  Total Score: 18     AM-PAC Raw Score CMS G-Code Modifier Level of Impairment Assistance   6 % Total / Unable   7 - 9 CM 80 - 100% Maximal Assist   10 - 14 CL 60 - 80% Moderate Assist   15 - 19 CK 40 - 60% Moderate Assist   20 - 22 CJ 20 - 40% Minimal Assist   23 CI 1-20% SBA / CGA   24 CH 0% Independent/ Mod I     Patient left HOB elevated with all lines intact, call button in reach and nursing notified.    Assessment:   Abbey Alvarado is a 71 y.o. female with a medical diagnosis of Weakness and presents with R leg weakness as compared to L.  Tired both RW and SPC with pt and safer with RW .  Therapy to maximize functional mobility and address impairments including R leg weakness.  Feel she would benefit from OP upon DC   .    Rehab identified problem list/impairments: Rehab identified problem list/impairments: weakness, impaired endurance, impaired self care skills, impaired functional mobilty, decreased coordination, decreased safety awareness, pain, impaired balance    Rehab potential is good.    Activity tolerance: Good    Discharge recommendations: Discharge Facility/Level Of Care Needs: outpatient PT     Barriers to discharge: Barriers to Discharge: Inaccessible home environment, Decreased caregiver support    Equipment recommendations: Equipment Needed After  Discharge: walker, rolling (refused BSC or 3 in 1)     GOALS:   Physical Therapy Goals        Problem: Physical Therapy Goal    Goal Priority Disciplines Outcome Goal Variances Interventions   Physical Therapy Goal     PT/OT, PT Ongoing (interventions implemented as appropriate)     Description:  Goals to be met by: 17     Patient will increase functional independence with mobility by performin. Supine to sit with Modified [place]  2. Sit to supine with Modified [place]  3. Sit to stand transfer with Modified [place]  4. Gait  x 150 feet with Modified [place] using Rolling Walker  5.  Ascend and descend 3 steps with SPC and HHA/ CGA .                 PLAN:    Patient to be seen 6 x/week to address the above listed problems via gait training, therapeutic activities, therapeutic exercises  Plan of Care expires: 02/15/17  Plan of Care reviewed with: patient    Functional Assessment Tool Used: AMPAC  Score: 18  Functional Limitation: Mobility: Walking and moving around  Mobility: Walking and Moving Around Current Status (): CK  Mobility: Walking and Moving Around Goal Status (): MARAH Goodman, PT  2017

## 2017-01-16 NOTE — PLAN OF CARE
"Problem: Patient Care Overview  Goal: Plan of Care Review  Outcome: Ongoing (interventions implemented as appropriate)  Plan of care reviewed with patient, fall risk discussed with bed alarm activated. Pt encouraged to call when she needs to get up for any reason, absorbant pads utilized. Pt's orthostatic blood pressures have been taken, no significant decrease in baseline blood pressures noted. No complaints of dizziness or SOB at this time. Pt does complain of pain at right lower rib cage stating she "fell into a corner of a piece of furniture and bruised her ribs." No signs of bruising noted, pt placed on telemetry and is running NSR. Pt becomes disoriented to place at night, but quickly becomes re-oriented. VSS, bed in lowest, locked position and call light in reach. Will continue to monitor.      "

## 2017-01-16 NOTE — CONSULTS
[facility]  Neurology  Consult Note    Patient Name: Abbey Alvarado  MRN: [id number]  Admission Date: 1/15/2017  Hospital Length of Stay: 0 days  Code Status: Full Code   Attending Provider: Tony Chirinos MD   Consulting Provider: Ramses Casey MD  Primary Care Physician: Debo Hensley MD  Principal Problem:Weakness    Consults   Subjective:     Chief Complaint:  Right leg pain and weakness     HPI:   This is a 71-year-old female who was initially transferred from the [facility] for weakness and neurology evaluation.  She was seen with similar complaints in late December 2016 and had MR imaging of head and neck appeared on okay.  She came with complaints of right lower extremity weakness that has occasionally called her to fall.  This has been going on for a while. Her orthopedist is Dr. Gurinder López and he gives her spinal injections which help and she had one on 12/5 and 1/5.  She ambulates using a cane for stability.  She has been on pain medications for chronic back problems.  An MRI scan of the brain done December 2016 showed mild involutional changes and findings consistent mild microvascular ischemic change not appearing unusual for the patient's age.     Past Medical History   Diagnosis Date    Diabetes mellitus     Hiatal hernia     Hypertension     Stomach ulcer        Past Surgical History   Procedure Laterality Date    Cholecystectomy      Shoulder surgery       Titanium in shoulder       Review of patient's allergies indicates:   Allergen Reactions    Percocet [oxycodone-acetaminophen]     Ultram [tramadol]        Current Neurological Medications: none    No current facility-administered medications on file prior to encounter.      Current Outpatient Prescriptions on File Prior to Encounter   Medication Sig    amlodipine (NORVASC) 10 MG tablet Take 10 mg by mouth once daily.    clonazePAM (KLONOPIN) 1 MG tablet Take 1 mg by mouth daily as needed for  Anxiety.    duloxetine (CYMBALTA) 20 MG capsule Take 1 capsule (20 mg total) by mouth 2 (two) times daily.    hydrocodone-acetaminophen 10-325mg (NORCO)  mg Tab Take 1 tablet by mouth every 4 (four) hours as needed for Pain.    metformin (GLUCOPHAGE-XR) 500 MG 24 hr tablet Take 500 mg by mouth daily with breakfast.    omeprazole (PRILOSEC) 40 MG capsule Take 40 mg by mouth once daily.     Family History     Problem Relation (Age of Onset)    COPD Father    Cancer Father    Heart disease Mother        Social History Main Topics    Smoking status: Never Smoker    Smokeless tobacco: Not on file    Alcohol use No    Drug use: No    Sexual activity: No     Review of Systems   Constitutional: Negative.    HENT: Negative.  Negative for hearing loss.    Eyes: Negative.  Negative for visual disturbance.   Respiratory: Negative.  Negative for shortness of breath.    Cardiovascular: Negative.  Negative for chest pain and palpitations.   Gastrointestinal: Negative.    Endocrine: Negative.    Genitourinary: Negative.    Musculoskeletal: Positive for arthralgias, back pain and gait problem. Negative for neck pain.   Skin: Negative.    Allergic/Immunologic: Negative.    Neurological: Negative for tremors, seizures, syncope, speech difficulty, weakness, numbness and headaches.   Hematological: Negative.    Psychiatric/Behavioral: Negative.  Negative for confusion and decreased concentration.     Objective:     Vital Signs (Most Recent):  Temp: 97.7 °F (36.5 °C) (01/16/17 1145)  Pulse: 63 (01/16/17 1400)  Resp: 18 (01/16/17 1145)  BP: (!) 142/65 (01/16/17 1145)  SpO2: 95 % (01/16/17 1145) Vital Signs (24h Range):  Temp:  [97.4 °F (36.3 °C)-97.7 °F (36.5 °C)] 97.7 °F (36.5 °C)  Pulse:  [57-77] 63  Resp:  [18] 18  SpO2:  [95 %-96 %] 95 %  BP: (133-148)/(61-67) 142/65     Weight: 75.8 kg (167 lb 1.7 oz)  Body mass index is 26.17 kg/(m^2).    Physical Exam   Constitutional: She is oriented to person, place, and time. She  appears well-developed and well-nourished.   HENT:   Head: Normocephalic and atraumatic.   Neck: Normal range of motion. Neck supple. Carotid bruit is not present.   Neurological: She is oriented to person, place, and time. She has a normal Finger-Nose-Finger Test.   Reflex Scores:       Tricep reflexes are 1+ on the right side and 1+ on the left side.       Bicep reflexes are 1+ on the right side and 1+ on the left side.       Brachioradialis reflexes are 1+ on the right side and 1+ on the left side.       Patellar reflexes are 1+ on the right side and 1+ on the left side.       Achilles reflexes are 1+ on the right side and 1+ on the left side.  Vitals reviewed.      NEUROLOGICAL EXAMINATION:     MENTAL STATUS   Oriented to person, place, and time.   Level of consciousness: alert  Knowledge: good.   Able to read. Able to repeat. Normal comprehension.     CRANIAL NERVES   Cranial nerves II through XII intact.     MOTOR EXAM     Strength   Strength 5/5 except as noted.        Bedside testing revealed good power in the lower extremities though with some clumsiness of fine motor in the right lower extremity.  Gait was not tested.  She did report that walking was difficult on the right because of low back pain radiating down the right leg.     REFLEXES     Reflexes   Right brachioradialis: 1+  Left brachioradialis: 1+  Right biceps: 1+  Left biceps: 1+  Right triceps: 1+  Left triceps: 1+  Right patellar: 1+  Left patellar: 1+  Right achilles: 1+  Left achilles: 1+  Right plantar: normal  Left plantar: normal    SENSORY EXAM   Right arm light touch: normal  Left arm light touch: normal  Right leg light touch: normal  Left leg light touch: normal  Right leg proprioception: normal  Left leg proprioception: normal  Right arm pinprick: normal  Left arm pinprick: normal  Right leg pinprick: decreased from knee  Left leg pinprick: normal    GAIT AND COORDINATION      Coordination   Finger to nose coordination:  normal    Tremor   Resting tremor: absent  Intention tremor: absent  Action tremor: absent       Gait not tested.  Noted PT/OT notes and evaluation specially regarding gait assessment       Significant Labs: All pertinent lab results from the past 24 hours have been reviewed.    Significant Imaging: Reviewed recent MRI/MRA of the brain done in December 2016 which did not show any acute intracranial abnormality or ischemic lesion.    Assessment and Plan:     Weakness of right lower extremity  Clinical history and examination and workup done was consistent with right leg problems related to lumbar spine disease and associated radiculopathy.  The patient has chronic pain.  Recommendations: Patient is to follow-up with orthopedic surgery, who has been her treating physician for the back problems.  She might benefit from getting an EMG/NCS as an outpatient however would defer this to her orthopedic surgeon.  Discussed fall precautions with patient.      VTE Risk Mitigation         Ordered     enoxaparin injection 40 mg  Daily     Route:  Subcutaneous        01/15/17 2246     Medium Risk of VTE  Once      01/15/17 2246          Thank you for your consult. I will sign off. Please contact us if you have any additional questions.    Ramses Casey MD  Neurology  [facility]

## 2017-01-17 LAB
ALBUMIN SERPL BCP-MCNC: 2.8 G/DL
ALP SERPL-CCNC: 80 U/L
ALT SERPL W/O P-5'-P-CCNC: 11 U/L
ANION GAP SERPL CALC-SCNC: 11 MMOL/L
AST SERPL-CCNC: 15 U/L
BASOPHILS # BLD AUTO: 0.01 K/UL
BASOPHILS NFR BLD: 0.2 %
BILIRUB SERPL-MCNC: 0.3 MG/DL
BUN SERPL-MCNC: 6 MG/DL
CALCIUM SERPL-MCNC: 8.9 MG/DL
CHLORIDE SERPL-SCNC: 106 MMOL/L
CO2 SERPL-SCNC: 22 MMOL/L
CREAT SERPL-MCNC: 0.7 MG/DL
DIFFERENTIAL METHOD: NORMAL
EOSINOPHIL # BLD AUTO: 0.1 K/UL
EOSINOPHIL NFR BLD: 2 %
ERYTHROCYTE [DISTWIDTH] IN BLOOD BY AUTOMATED COUNT: 12.7 %
EST. GFR  (AFRICAN AMERICAN): >60 ML/MIN/1.73 M^2
EST. GFR  (NON AFRICAN AMERICAN): >60 ML/MIN/1.73 M^2
ESTIMATED AVG GLUCOSE: 117 MG/DL
GLUCOSE SERPL-MCNC: 86 MG/DL
HBA1C MFR BLD HPLC: 5.7 %
HCT VFR BLD AUTO: 37 %
HGB BLD-MCNC: 12 G/DL
LYMPHOCYTES # BLD AUTO: 2.2 K/UL
LYMPHOCYTES NFR BLD: 39.4 %
MAGNESIUM SERPL-MCNC: 1.8 MG/DL
MCH RBC QN AUTO: 29.6 PG
MCHC RBC AUTO-ENTMCNC: 32.4 %
MCV RBC AUTO: 91 FL
MONOCYTES # BLD AUTO: 0.6 K/UL
MONOCYTES NFR BLD: 11.3 %
NEUTROPHILS # BLD AUTO: 2.6 K/UL
NEUTROPHILS NFR BLD: 46.6 %
PLATELET # BLD AUTO: 300 K/UL
PMV BLD AUTO: 10.3 FL
POCT GLUCOSE: 100 MG/DL (ref 70–110)
POCT GLUCOSE: 103 MG/DL (ref 70–110)
POCT GLUCOSE: 122 MG/DL (ref 70–110)
POCT GLUCOSE: 92 MG/DL (ref 70–110)
POCT GLUCOSE: 98 MG/DL (ref 70–110)
POTASSIUM SERPL-SCNC: 3.9 MMOL/L
PROT SERPL-MCNC: 5.8 G/DL
RBC # BLD AUTO: 4.05 M/UL
SODIUM SERPL-SCNC: 139 MMOL/L
WBC # BLD AUTO: 5.58 K/UL

## 2017-01-17 PROCEDURE — 11000001 HC ACUTE MED/SURG PRIVATE ROOM

## 2017-01-17 PROCEDURE — 99233 SBSQ HOSP IP/OBS HIGH 50: CPT | Mod: ,,, | Performed by: HOSPITALIST

## 2017-01-17 PROCEDURE — 25000003 PHARM REV CODE 250: Performed by: INTERNAL MEDICINE

## 2017-01-17 PROCEDURE — 97112 NEUROMUSCULAR REEDUCATION: CPT

## 2017-01-17 PROCEDURE — 80053 COMPREHEN METABOLIC PANEL: CPT

## 2017-01-17 PROCEDURE — 97530 THERAPEUTIC ACTIVITIES: CPT

## 2017-01-17 PROCEDURE — 36415 COLL VENOUS BLD VENIPUNCTURE: CPT

## 2017-01-17 PROCEDURE — 63600175 PHARM REV CODE 636 W HCPCS: Performed by: INTERNAL MEDICINE

## 2017-01-17 PROCEDURE — 25000003 PHARM REV CODE 250: Performed by: HOSPITALIST

## 2017-01-17 PROCEDURE — 85025 COMPLETE CBC W/AUTO DIFF WBC: CPT

## 2017-01-17 PROCEDURE — 83735 ASSAY OF MAGNESIUM: CPT

## 2017-01-17 RX ORDER — CYCLOBENZAPRINE HCL 5 MG
5 TABLET ORAL 3 TIMES DAILY PRN
Status: DISCONTINUED | OUTPATIENT
Start: 2017-01-17 | End: 2017-01-20 | Stop reason: HOSPADM

## 2017-01-17 RX ORDER — HYDROMORPHONE HYDROCHLORIDE 1 MG/ML
0.5 INJECTION, SOLUTION INTRAMUSCULAR; INTRAVENOUS; SUBCUTANEOUS EVERY 6 HOURS PRN
Status: DISCONTINUED | OUTPATIENT
Start: 2017-01-17 | End: 2017-01-20 | Stop reason: HOSPADM

## 2017-01-17 RX ORDER — CYCLOBENZAPRINE HCL 10 MG
10 TABLET ORAL ONCE
Status: COMPLETED | OUTPATIENT
Start: 2017-01-17 | End: 2017-01-17

## 2017-01-17 RX ORDER — HYDROMORPHONE HYDROCHLORIDE 1 MG/ML
0.5 INJECTION, SOLUTION INTRAMUSCULAR; INTRAVENOUS; SUBCUTANEOUS ONCE
Status: COMPLETED | OUTPATIENT
Start: 2017-01-17 | End: 2017-01-17

## 2017-01-17 RX ADMIN — POTASSIUM & SODIUM PHOSPHATES POWDER PACK 280-160-250 MG 1 PACKET: 280-160-250 PACK at 05:01

## 2017-01-17 RX ADMIN — POTASSIUM & SODIUM PHOSPHATES POWDER PACK 280-160-250 MG 1 PACKET: 280-160-250 PACK at 08:01

## 2017-01-17 RX ADMIN — HYDROCODONE BITARTRATE AND ACETAMINOPHEN 1 TABLET: 5; 325 TABLET ORAL at 05:01

## 2017-01-17 RX ADMIN — POTASSIUM & SODIUM PHOSPHATES POWDER PACK 280-160-250 MG 1 PACKET: 280-160-250 PACK at 11:01

## 2017-01-17 RX ADMIN — DULOXETINE HYDROCHLORIDE 20 MG: 20 CAPSULE, DELAYED RELEASE ORAL at 09:01

## 2017-01-17 RX ADMIN — PANTOPRAZOLE SODIUM 40 MG: 40 TABLET, DELAYED RELEASE ORAL at 08:01

## 2017-01-17 RX ADMIN — DULOXETINE HYDROCHLORIDE 20 MG: 20 CAPSULE, DELAYED RELEASE ORAL at 08:01

## 2017-01-17 RX ADMIN — AMLODIPINE BESYLATE 10 MG: 5 TABLET ORAL at 08:01

## 2017-01-17 RX ADMIN — HYDROMORPHONE HYDROCHLORIDE 0.5 MG: 1 INJECTION, SOLUTION INTRAMUSCULAR; INTRAVENOUS; SUBCUTANEOUS at 11:01

## 2017-01-17 RX ADMIN — CYCLOBENZAPRINE HYDROCHLORIDE 10 MG: 10 TABLET, FILM COATED ORAL at 10:01

## 2017-01-17 RX ADMIN — HYDROMORPHONE HYDROCHLORIDE 0.5 MG: 1 INJECTION, SOLUTION INTRAMUSCULAR; INTRAVENOUS; SUBCUTANEOUS at 07:01

## 2017-01-17 RX ADMIN — POTASSIUM & SODIUM PHOSPHATES POWDER PACK 280-160-250 MG 1 PACKET: 280-160-250 PACK at 09:01

## 2017-01-17 RX ADMIN — ENOXAPARIN SODIUM 40 MG: 100 INJECTION SUBCUTANEOUS at 11:01

## 2017-01-17 NOTE — PLAN OF CARE
"Problem: Patient Care Overview  Goal: Plan of Care Review  Outcome: Ongoing (interventions implemented as appropriate)  Possible discharge to home with family tomorrow discussed with patient. Pt says she anticipates having to use a cane as a mobility aid, verbalizes understanding to use on unaffected side. Pt reports "feeling better today," VSS, pt ambulates to bathroom independently and call light in reach. Pt appears to rest comfortably, sleeping in between care. Will continue to monitor.       "

## 2017-01-17 NOTE — PT/OT/SLP PROGRESS
Occupational Therapy      Abbey Alvarado  MRN: [id number]    Patient not seen today secondary to Patient fatigue (after receiving meds). Will follow-up 01/18/17.    BARB Jara  1/17/2017

## 2017-01-17 NOTE — PT/OT/SLP PROGRESS
Physical Therapy  Treatment    Abbey Alvarado   MRN: [id number]   Admitting Diagnosis: Weakness    PT Received On: 17  PT Start Time: 1558     PT Stop Time: 1615    PT Total Time (min): 17 min       Billable Minutes:  Therapeutic Activity 9 and Neuromuscular Re-education 8    Treatment Type: Treatment  PT/PTA: PT             General Precautions: Standard, anti-coagulation medicine, diabetic, fall  Orthopedic Precautions: N/A   Braces:      Do you have any cultural, spiritual, Congregational conflicts, given your current situation?: no     Results from MRI of spine:  Please note degenerative changes most pronounced at the L2/L3 level with moderate central canal stenosis and at least mild bilateral foraminal stenosis.    Subjective:  Communicated with nursing prior to session.      Pain Ratin/10        Location:  (low back > R rib pain)  Pain Addressed: Pre-medicate for activity, Reposition, Distraction, Cessation of Activity  Pain Rating Post-Intervention: 6/10    Objective:   Patient found with: peripheral IV, telemetry    Functional Mobility:  Bed Mobility:   Supine to Sit: Supervision  Sit to Supine: Contact Guard Assistance (instructed pt in log roll to get back in bed )    Transfers:  Sit <> Stand Assistance: Supervision  Sit <> Stand Assistive Device: Rolling Walker    Gait:   Gait Distance: 100' with RW cues to step into the RW-tends to push too far in front of her  Assistance 1: Contact Guard Assistance, Stand by Assistance  Gait Assistive Device: Rolling walker  Gait Pattern: reciprocal  Gait Deviation(s): decreased christian, decreased step length, decreased stride length, decreased swing-to-stance ratio    Stairs:  Not assessed    Balance:   Static Sit: GOOD: Takes MODERATE challenges from all directions  Dynamic Sit: GOOD-: Maintains balance through MODERATE excursions of active trunk movement,     Static Stand: FAIR+: Takes MINIMAL challenges from all directions  Dynamic stand: FAIR+: Needs CLOSE  SUPERVISION during gait and is able to right self with minor LOB     Therapeutic Activities and Exercises:  Instructed pt in back care-log roll to get in and out of bed, bed postioning including use of pillow between legs in sidelying.  Importance of repositioning pt up in bed when she finds herself sliding down in bed.      AM-PAC 6 CLICK MOBILITY  How much help from another person does this patient currently need?   1 = Unable, Total/Dependent Assistance  2 = A lot, Maximum/Moderate Assistance  3 = A little, Minimum/Contact Guard/Supervision  4 = None, Modified [place]/Independent    Turning over in bed (including adjusting bedclothes, sheets and blankets)?: 4  Sitting down on and standing up from a chair with arms (e.g., wheelchair, bedside commode, etc.): 3  Moving from lying on back to sitting on the side of the bed?: 4  Moving to and from a bed to a chair (including a wheelchair)?: 3  Need to walk in hospital room?: 3  Climbing 3-5 steps with a railing?: 3  Total Score: 20    AM-PAC Raw Score CMS G-Code Modifier Level of Impairment Assistance   6 % Total / Unable   7 - 9 CM 80 - 100% Maximal Assist   10 - 14 CL 60 - 80% Moderate Assist   15 - 19 CK 40 - 60% Moderate Assist   20 - 22 CJ 20 - 40% Minimal Assist   23 CI 1-20% SBA / CGA   24 CH 0% Independent/ Mod I     Patient left supine with all lines intact, call button in reach and nurse notified.    Assessment:  Abbey Alvarado is a 71 y.o. female with a medical diagnosis of Weakness and presents with cont back pain.  Unable to see this am as given IV pain meds and pt very groggy from them.  MRI shows degenerative changes most pronounced at the L2/L3 level with moderate central canal stenosis and at least mild bilateral foraminal stenosis. Would explain hip flex weakness on R and pain pt is having in low back.  Needs therapy here with progression to OP upon dc     Rehab identified problem list/impairments: Rehab identified problem  list/impairments: weakness, pain, impaired functional mobilty, impaired balance, gait instability, decreased safety awareness, decreased coordination    Rehab potential is good.    Activity tolerance: Fair    Discharge recommendations: Discharge Facility/Level Of Care Needs: outpatient PT     Barriers to discharge: Barriers to Discharge: Inaccessible home environment, Decreased caregiver support    Equipment recommendations: Equipment Needed After Discharge: walker, rolling     GOALS:   Physical Therapy Goals        Problem: Physical Therapy Goal    Goal Priority Disciplines Outcome Goal Variances Interventions   Physical Therapy Goal     PT/OT, PT Ongoing (interventions implemented as appropriate)     Description:  Goals to be met by: 17     Patient will increase functional independence with mobility by performin. Supine to sit with Modified [place]  2. Sit to supine with Modified [place]  3. Sit to stand transfer with Modified [place]  4. Gait  x 150 feet with Modified [place] using Rolling Walker  5.  Ascend and descend 3 steps with SPC and HHA/ CGA .                 PLAN:    Patient to be seen 6 x/week  to address the above listed problems via gait training, therapeutic activities, therapeutic exercises  Plan of Care expires: 02/15/17  Plan of Care reviewed with: patient         Rozina GENOVEVA Goodman, PT  2017

## 2017-01-17 NOTE — PROGRESS NOTES
Progress Note  [facility] Medicine      Admit Date: 1/15/2017 (LOS: 0)    SUBJECTIVE:     Follow-up For:  Weakness    HPI/Interval history: Pain reports lower back pain not well controlled with current pain regimen this morning.  No acute events.    Review of Systems: Patient denies chest pain, shortness of breath.  No fevers, chills.  No nausea, vomiting, or diarrhea.    Medications: Reviewed and documented in the MAR.    OBJECTIVE:     Vital Signs (Most Recent)  Temp: 98.3 °F (36.8 °C) (01/17/17 0709)  Pulse: 67 (01/17/17 0709)  Resp: 16 (01/17/17 0709)  BP: 137/80 (01/17/17 0709)  SpO2: 95 % (01/17/17 0709)    Vital Signs Range (Last 24H):  Temp:  [97.4 °F (36.3 °C)-98.3 °F (36.8 °C)]   Pulse:  [52-77]   Resp:  [16-18]   BP: (137-150)/(64-80)   SpO2:  [95 %-96 %]     I & O (Last 24H):  Intake/Output Summary (Last 24 hours) at 01/17/17 0717  Last data filed at 01/17/17 0500   Gross per 24 hour   Intake              480 ml   Output              550 ml   Net              -70 ml       BMI: Body mass index is 26.17 kg/(m^2).    Physical Exam:  General: Patient is awake, alert, and oriented, and in no acute distress.  Eyes: Pupils equal, round, and reactive to light, anicteric sclera.  Mouth: No lesions, moist mucous membranes.  Respiratory: Lungs clear to ausculation, no crackles or wheezing.  Cardiovascular: Regular rate and rythmn, no murmurs appreciated.  Abdomen: Soft, non-tender, non-distended, normal bowel sounds.  Skin: No rashes or breakdown.  Extremities: No edema, cyanosis, or clubbing.  Neuro: No focal weakness appreciated despite history of right lower extremity weakness.  Improving?    Diagnostic Results:  CBC:    Recent Labs  Lab 01/16/17  0029 01/16/17  0436 01/17/17  0420   WBC 6.21 6.08 5.58   HGB 11.8* 11.5* 12.0   HCT 35.8* 35.2* 37.0    309 300   MCV 91 91 91     BMP:    Recent Labs  Lab 01/16/17  0029 01/16/17  0435 01/17/17  0420    92 86    141 139   K 3.5 3.7 3.9    110  106   CO2 22* 22* 22*   BUN 10 8 6*   CREATININE 0.7 0.7 0.7   CALCIUM 9.3 8.6* 8.9   MG 1.9 2.0 1.8   PHOS 2.1*  --   --      MRI:  Lumbar spine: Heterogeneous mild edema signal and enhancement within the inferior L2 and superior L3 vertebral end plates with trace disc fluid. While there is bulging disc at this level and likely degenerative process cannot exclude superimposed component of Spondylodiscitis. Clinical correlation advised. No evidence for epidural collection to suggest abscess with thin epidural enhancement likely reactive.    TSH within normal limits.  RPR non-reactive    ASSESSMENT/PLAN:     Active Hospital Problems    Diagnosis  POA    *Weakness [R53.1]  Yes    Falls [W19.XXXA]  Yes    Hypokalemia [E87.6]  Yes    Hypomagnesemia [E83.42]  Yes    Hypophosphatemia [E83.39]  Yes    Weakness of right lower extremity [R29.898]  Yes    Depression [F32.9]  Yes    Controlled type 2 diabetes mellitus without complication [E11.9]  Yes    Hypertension associated with diabetes [E11.59, I10]  Yes      Resolved Hospital Problems    Diagnosis Date Resolved POA   No resolved problems to display.       Weakness of right lower extremity with chronic lower back pain.  Patient transferred from [facility] for neurology evaluation.  Dr. Ramses Casey evaluated and examined patient and his impression is that her weakness and pain are due to lumbar spine disease associated with radiculopathy.  Dr. Casey recommends outpatient follow-up with her orthopedic surgeon.  I spoke with radiologist regarding MRI findings which did reveal mild edema signal and enhancement within inferior L2 and superior L3 of the vertebral end plates consistent with inflammation due to degenerative process but until to exclude superimposed spondylodiscitis.   In light of the fact that patient is afebrile, with normal white blood count, and CRP and sed rate only mildly elevated to 15 mg/L and 27 mm/Hr respectively,  infectious process seems less likely.  Will discuss with Dr. Casey.  Pain regimen adjusted this morning and will continue with therapy.    Hypertension.  Reasonably controlled with current regimen.  Will continue with current regimen and continue to monitor.    Diabetes mellitus type II.  Hemoglobin A1c pending.  Well controlled now based on capillary blood glucose measurements.      Depression.  Stable.  Continue with home regimen.    DVT prophylaxis.  Subcutaneous enoxaparin.

## 2017-01-17 NOTE — PLAN OF CARE
Problem: Physical Therapy Goal  Goal: Physical Therapy Goal  Goals to be met by: 17     Patient will increase functional independence with mobility by performin. Supine to sit with Modified [place]  2. Sit to supine with Modified [place]  3. Sit to stand transfer with Modified [place]  4. Gait x 150 feet with Modified [place] using Rolling Walker  5. Ascend and descend 3 steps with SPC and HHA/ CGA .    Outcome: Ongoing (interventions implemented as appropriate)  Pt progressing toward goals.  needs cueing for safe use of RW,..  Instructed in log roll and postioning in bed for back-need to reinforce.  No ex and limited distance for gait as pt didn't want back pain to increase  REC;  OP PT  DME;  RW

## 2017-01-17 NOTE — PLAN OF CARE
Problem: Patient Care Overview  Goal: Plan of Care Review  Outcome: Ongoing (interventions implemented as appropriate)  Plan of care reviewed with patient, Vital signs stable.  Pt had complaint of pain to back and rib cage, New order for  new pain  medications noted. Pt medicated as ordered and noted with full relief. PT also ambulated patient during this shift. Pt tolerated activities well after pain medications was administered.  Blood sugar well controlled. Safety maintained. Pt denies any shortness of breath, or any chest pain.

## 2017-01-18 LAB
1,25(OH)2D3 SERPL-MCNC: 48 PG/ML
ALBUMIN SERPL BCP-MCNC: 2.9 G/DL
ALP SERPL-CCNC: 81 U/L
ALT SERPL W/O P-5'-P-CCNC: 11 U/L
ANION GAP SERPL CALC-SCNC: 11 MMOL/L
AST SERPL-CCNC: 17 U/L
BASOPHILS # BLD AUTO: 0.02 K/UL
BASOPHILS NFR BLD: 0.3 %
BILIRUB SERPL-MCNC: 0.3 MG/DL
BUN SERPL-MCNC: 7 MG/DL
CALCIUM SERPL-MCNC: 9.2 MG/DL
CHLORIDE SERPL-SCNC: 104 MMOL/L
CO2 SERPL-SCNC: 22 MMOL/L
CREAT SERPL-MCNC: 0.8 MG/DL
DIFFERENTIAL METHOD: NORMAL
EOSINOPHIL # BLD AUTO: 0.1 K/UL
EOSINOPHIL NFR BLD: 1.5 %
ERYTHROCYTE [DISTWIDTH] IN BLOOD BY AUTOMATED COUNT: 12.6 %
EST. GFR  (AFRICAN AMERICAN): >60 ML/MIN/1.73 M^2
EST. GFR  (NON AFRICAN AMERICAN): >60 ML/MIN/1.73 M^2
GLUCOSE SERPL-MCNC: 94 MG/DL
HCT VFR BLD AUTO: 38.1 %
HGB BLD-MCNC: 12.7 G/DL
LYMPHOCYTES # BLD AUTO: 2.3 K/UL
LYMPHOCYTES NFR BLD: 34.4 %
MAGNESIUM SERPL-MCNC: 2 MG/DL
MCH RBC QN AUTO: 30.4 PG
MCHC RBC AUTO-ENTMCNC: 33.3 %
MCV RBC AUTO: 91 FL
MONOCYTES # BLD AUTO: 0.7 K/UL
MONOCYTES NFR BLD: 10.5 %
NEUTROPHILS # BLD AUTO: 3.5 K/UL
NEUTROPHILS NFR BLD: 52.6 %
PLATELET # BLD AUTO: 287 K/UL
PMV BLD AUTO: 10.1 FL
POCT GLUCOSE: 105 MG/DL (ref 70–110)
POCT GLUCOSE: 116 MG/DL (ref 70–110)
POCT GLUCOSE: 95 MG/DL (ref 70–110)
POTASSIUM SERPL-SCNC: 3.9 MMOL/L
PROT SERPL-MCNC: 6.1 G/DL
RBC # BLD AUTO: 4.18 M/UL
SODIUM SERPL-SCNC: 137 MMOL/L
WBC # BLD AUTO: 6.68 K/UL

## 2017-01-18 PROCEDURE — 63600175 PHARM REV CODE 636 W HCPCS: Performed by: INTERNAL MEDICINE

## 2017-01-18 PROCEDURE — 99232 SBSQ HOSP IP/OBS MODERATE 35: CPT | Mod: ,,, | Performed by: HOSPITALIST

## 2017-01-18 PROCEDURE — 97116 GAIT TRAINING THERAPY: CPT

## 2017-01-18 PROCEDURE — 83735 ASSAY OF MAGNESIUM: CPT

## 2017-01-18 PROCEDURE — 97530 THERAPEUTIC ACTIVITIES: CPT

## 2017-01-18 PROCEDURE — 80053 COMPREHEN METABOLIC PANEL: CPT

## 2017-01-18 PROCEDURE — 85025 COMPLETE CBC W/AUTO DIFF WBC: CPT

## 2017-01-18 PROCEDURE — 25000003 PHARM REV CODE 250: Performed by: INTERNAL MEDICINE

## 2017-01-18 PROCEDURE — 11000001 HC ACUTE MED/SURG PRIVATE ROOM

## 2017-01-18 PROCEDURE — 36415 COLL VENOUS BLD VENIPUNCTURE: CPT

## 2017-01-18 PROCEDURE — 25000003 PHARM REV CODE 250: Performed by: HOSPITALIST

## 2017-01-18 PROCEDURE — 97535 SELF CARE MNGMENT TRAINING: CPT

## 2017-01-18 RX ADMIN — PANTOPRAZOLE SODIUM 40 MG: 40 TABLET, DELAYED RELEASE ORAL at 09:01

## 2017-01-18 RX ADMIN — HYDROMORPHONE HYDROCHLORIDE 0.5 MG: 1 INJECTION, SOLUTION INTRAMUSCULAR; INTRAVENOUS; SUBCUTANEOUS at 11:01

## 2017-01-18 RX ADMIN — ENOXAPARIN SODIUM 40 MG: 100 INJECTION SUBCUTANEOUS at 11:01

## 2017-01-18 RX ADMIN — DULOXETINE HYDROCHLORIDE 20 MG: 20 CAPSULE, DELAYED RELEASE ORAL at 09:01

## 2017-01-18 RX ADMIN — POTASSIUM & SODIUM PHOSPHATES POWDER PACK 280-160-250 MG 1 PACKET: 280-160-250 PACK at 09:01

## 2017-01-18 RX ADMIN — HYDROMORPHONE HYDROCHLORIDE 0.5 MG: 1 INJECTION, SOLUTION INTRAMUSCULAR; INTRAVENOUS; SUBCUTANEOUS at 05:01

## 2017-01-18 RX ADMIN — AMLODIPINE BESYLATE 10 MG: 5 TABLET ORAL at 09:01

## 2017-01-18 RX ADMIN — CYCLOBENZAPRINE HYDROCHLORIDE 5 MG: 5 TABLET, FILM COATED ORAL at 04:01

## 2017-01-18 RX ADMIN — POTASSIUM & SODIUM PHOSPHATES POWDER PACK 280-160-250 MG 1 PACKET: 280-160-250 PACK at 12:01

## 2017-01-18 RX ADMIN — HYDROCODONE BITARTRATE AND ACETAMINOPHEN 1 TABLET: 5; 325 TABLET ORAL at 09:01

## 2017-01-18 NOTE — PLAN OF CARE
Problem: Occupational Therapy Goal  Goal: Occupational Therapy Goal  Goals to be met by: 01/21/17     Patient will increase functional independence with ADLs by performing:    LE Dressing with Supervision and RW, PRN.  Grooming while standing at sink with Supervision and RW, PRN.  Toileting from toilet with Supervision and RW, PRN, for hygiene and clothing management.   Stand pivot transfers with Supervision, RW PRN.  Toilet transfer to toilet with Supervision and RW, PRN.   Outcome: Ongoing (interventions implemented as appropriate)  Increased pain and locations of pain on this date, limiting session.  Continue OT services to progress toward goals.    Comments:   BARB Jara, 1/18/2017

## 2017-01-18 NOTE — PLAN OF CARE
Problem: Physical Therapy Goal  Goal: Physical Therapy Goal  Goals to be met by: 17     Patient will increase functional independence with mobility by performin. Supine to sit with Modified [place]  2. Sit to supine with Modified [place]  3. Sit to stand transfer with Modified [place]  4. Gait x 150 feet with Modified [place] using Rolling Walker  5. Ascend and descend 3 steps with SPC and HHA/ CGA .    Outcome: Ongoing (interventions implemented as appropriate)  Progressing well towards goals

## 2017-01-18 NOTE — PT/OT/SLP PROGRESS
"Occupational Therapy  Treatment    Abbey Alvarado   MRN: [id number]   Admitting Diagnosis: Weakness    OT Date of Treatment: 01/18/17   OT Start Time: 0838  OT Stop Time: 0852  OT Total Time (min): 14 min    Billable Minutes:  Self Care/Home Management 14    General Precautions: Standard, anti-coagulation medicine, diabetic, fall  Orthopedic Precautions: N/A  Braces: N/A    Do you have any cultural, spiritual, Congregation conflicts, given your current situation?: No    Subjective:  Communicated with nsg prior to session.  "I just hurt so much"    Pain Rating: 10/10  Location - Side: Bilateral (quads, back and (R) ribs)        Pain Addressed: Reposition, Distraction, Cessation of Activity, Nurse notified (Nurse Mcwilliams)  Pain Rating Post-Intervention: 10/10    Objective:  Patient found with: peripheral IV, telemetry     Functional Mobility:  Bed Mobility:  Supine to Sit: Supervision, WIth side rail (cues for technique)  Sit to Supine: Supervision, With side rail (cues for technique)    Transfers:   Sit <> Stand Assistance: Stand By Assistance  Sit <> Stand Assistive Device: No Assistive Device (declined use of RW within room)  Bed <> Chair Technique: Stand Pivot  Bed <> Chair Transfer Assistance: Stand By Assistance  Bed <> Chair Assistive Device: No Assistive Device  Toilet Transfer Technique: Stand Pivot  Toilet Transfer Assistance: Stand By Assistance  Toilet Transfer Assistive Device: grab bar    Functional Ambulation: SBA within room, declined use of RW within room    Activities of Daily Living:  LE Dressing Level of Assistance: Supervision (slipper socks, declined to dress fully during session)  Grooming Position: Standing at sink  Grooming Level of Assistance: Stand by assistance  Toileting Where Assessed: Toilet  Toileting Level of Assistance: Stand by assistance    Balance:   Static Sit: GOOD: Takes MODERATE challenges from all directions  Dynamic Sit: GOOD: Maintains balance through MODERATE excursions of active " trunk movement  Static Stand: FAIR: Maintains without assist but unable to take challenges  Dynamic stand: FAIR: Needs CONTACT GUARD during gait    Therapeutic Activities and Exercises:  Educ on use of log rolling for in/out bed and not to twist self in bed when answering phone.  Verbalized understanding.    AM-PAC 6 CLICK ADL   How much help from another person does this patient currently need?   1 = Unable, Total/Dependent Assistance  2 = A lot, Maximum/Moderate Assistance  3 = A little, Minimum/Contact Guard/Supervision  4 = None, Modified [place]/Independent    Putting on and taking off regular lower body clothing? : 3  Bathing (including washing, rinsing, drying)?: 3  Toileting, which includes using toilet, bedpan, or urinal? : 3  Putting on and taking off regular upper body clothing?: 3  Taking care of personal grooming such as brushing teeth?: 3  Eating meals?: 4  Total Score: 19     AM-PAC Raw Score CMS G-Code Modifier Level of Impairment Assistance   6 % Total / Unable   7 - 9 CM 80 - 100% Maximal Assist   10 - 14 CL 60 - 80% Moderate Assist   15 - 19 CK 40 - 60% Moderate Assist   20 - 22 CJ 20 - 40% Minimal Assist   23 CI 1-20% SBA / CGA   24 CH 0% Independent/ Mod I     Patient left HOB elevated with all lines intact, call button in reach and nsg notified.  Attempted to sit UIC, but c/o pain and unable to tolerate.    ASSESSMENT:  Abbey Alvarado is a 71 y.o. female with a medical diagnosis of Weakness and presents with impaired mobility and tolerance for tasks secondary to pain.  Pt. with increased areas of pain and declined use of RW with in-room mobility (stated she would use for distances).  Will continue to benefit from OT services for ADL task modifications to help with pain mgmt.    Rehab identified problem list/impairments: Rehab identified problem list/impairments: weakness, impaired endurance, impaired self care skills, impaired functional mobilty, gait instability, impaired balance,  decreased lower extremity function, decreased safety awareness, pain    Rehab potential is good.    Activity tolerance: Fair    Discharge recommendations: Discharge Facility/Level Of Care Needs: outpatient PT     Barriers to discharge: Barriers to Discharge: Inaccessible home environment, Decreased caregiver support    Equipment recommendations: walker, rolling (declines 3-in-1)     GOALS:   Occupational Therapy Goals        Problem: Occupational Therapy Goal    Goal Priority Disciplines Outcome Interventions   Occupational Therapy Goal     OT, PT/OT Ongoing (interventions implemented as appropriate)    Description:  Goals to be met by: 01/21/17     Patient will increase functional independence with ADLs by performing:    LE Dressing with Supervision and RW, PRN.  Grooming while standing at sink with Supervision and RW, PRN.  Toileting from toilet with Supervision and RW, PRN, for hygiene and clothing management.   Stand pivot transfers with Supervision, RW PRN.  Toilet transfer to toilet with Supervision and RW, PRN.                Plan:  Patient to be seen 5 x/week to address the above listed problems via self-care/home management, therapeutic activities, therapeutic exercises  Plan of Care expires:    Plan of Care reviewed with: patient         BARB Jara  01/18/2017

## 2017-01-18 NOTE — PROGRESS NOTES
Progress Note  [facility] Medicine      Admit Date: 1/15/2017 (LOS: 1)    SUBJECTIVE:     Follow-up For:  Weakness    HPI/Interval history: Patient reports somewhat improved but requiring intravenous pain medication.    Review of Systems: Patient denies chest pain, shortness of breath.  No fevers, chills.  No nausea, vomiting, or diarrhea.    Medications: Reviewed and documented in the MAR.    OBJECTIVE:     Vital Signs (Most Recent)  Temp: 98 °F (36.7 °C) (01/18/17 0715)  Pulse: 85 (01/18/17 1000)  Resp: 18 (01/18/17 0715)  BP: (!) 154/70 (01/18/17 0715)  SpO2: 98 % (01/18/17 0715)    Vital Signs Range (Last 24H):  Temp:  [97.6 °F (36.4 °C)-98.1 °F (36.7 °C)]   Pulse:  [58-95]   Resp:  [18]   BP: (120-154)/(57-70)   SpO2:  [93 %-98 %]     I & O (Last 24H):    Intake/Output Summary (Last 24 hours) at 01/18/17 1211  Last data filed at 01/18/17 1111   Gross per 24 hour   Intake             1200 ml   Output             1601 ml   Net             -401 ml       BMI: Body mass index is 26.08 kg/(m^2).    Physical Exam:  General: Patient is awake, alert, and oriented, and in no acute distress.  Eyes: Pupils equal, round, and reactive to light, anicteric sclera.  Mouth: No lesions, moist mucous membranes.  Respiratory: Lungs clear to ausculation, no crackles or wheezing.  Cardiovascular: Regular rate and rythmn, no murmurs appreciated.  Abdomen: Soft, non-tender, non-distended, normal bowel sounds.  Skin: No rashes or breakdown.  Extremities: No edema, cyanosis, or clubbing.  Neuro: No focal weakness.    Diagnostic Results:  CBC:    Recent Labs  Lab 01/16/17  0029 01/16/17  0436 01/17/17  0420 01/18/17  0419   WBC 6.21 6.08 5.58 6.68   HGB 11.8* 11.5* 12.0 12.7   HCT 35.8* 35.2* 37.0 38.1    309 300 287   MCV 91 91 91 91     BMP:    Recent Labs  Lab 01/16/17  0029 01/16/17  0435 01/17/17  0420 01/18/17  0418    92 86 94    141 139 137   K 3.5 3.7 3.9 3.9    110 106 104   CO2 22* 22* 22* 22*   BUN 10 8  6* 7*   CREATININE 0.7 0.7 0.7 0.8   CALCIUM 9.3 8.6* 8.9 9.2   MG 1.9 2.0 1.8 2.0   PHOS 2.1*  --   --   --      MRI:  Lumbar spine: Heterogeneous mild edema signal and enhancement within the inferior L2 and superior L3 vertebral end plates with trace disc fluid. While there is bulging disc at this level and likely degenerative process cannot exclude superimposed component of Spondylodiscitis. Clinical correlation advised. No evidence for epidural collection to suggest abscess with thin epidural enhancement likely reactive.    TSH within normal limits.  RPR non-reactive    ASSESSMENT/PLAN:     Active Hospital Problems    Diagnosis  POA    *Weakness [R53.1]  Yes    Falls [W19.XXXA]  Yes    Hypokalemia [E87.6]  Yes    Hypomagnesemia [E83.42]  Yes    Hypophosphatemia [E83.39]  Yes    Weakness of right lower extremity [R29.898]  Yes    Depression [F32.9]  Yes    Controlled type 2 diabetes mellitus without complication [E11.9]  Yes    Hypertension associated with diabetes [E11.59, I10]  Yes      Resolved Hospital Problems    Diagnosis Date Resolved POA   No resolved problems to display.       Weakness of right lower extremity with acute on chronic lower back pain.  Some improvement with conservative measures but still requiring intravenous pain medications to achieve reasonable pain control.  Will consult orthopedic surgery here for evaluation.    Hypertension.  Reasonably controlled with current regimen.  Will continue with current regimen and continue to monitor.    Diabetes mellitus type II.  Well controlled.  Continue with diabetic diet.      Depression.  Stable.  Continue with home regimen.    DVT prophylaxis.  Subcutaneous enoxaparin.

## 2017-01-18 NOTE — PT/OT/SLP PROGRESS
"Physical Therapy  Treatment    Abbey Alvarado   MRN: [id number]   Admitting Diagnosis: Weakness    PT Received On: 01/18/17  PT Start Time: 0930     PT Stop Time: 0954    PT Total Time (min): 24 min       Billable Minutes:  Gait [id number] and Therapeutic Activity 8    Treatment Type: Treatment  PT/PTA: PTA     PTA Visit Number: 1       General Precautions: Standard, anti-coagulation medicine, diabetic, fall  Orthopedic Precautions: N/A   Braces: N/A    Do you have any cultural, spiritual, Yarsanism conflicts, given your current situation?: no    Subjective:  Communicated with prior to session. Pt. Stated " I have so much pain"     Pain Rating:  (patient complained of 10/10 pain in back, Legs)     Objective:   Patient found with: telemetry, peripheral IV  Supine in bed     Functional Mobility:  Bed Mobility: educated patient on log rolling  Scooting/Bridging: Supervision  Supine to Sit: Supervision    Transfers: sit to stand from bed   Sit <> Stand Assistance: Supervision  Sit <> Stand Assistive Device: Rolling Walker    Gait: SBA for safety  Gait Distance: 200 feet with RW   Gait Assistive Device: Rolling walker  Gait Pattern: reciprocal  Gait Deviation(s): decreased christian, decreased step length    Therapeutic Activities and Exercises:  Patient was independent with self cleaning in bathroom    AM-PAC 6 CLICK MOBILITY  How much help from another person does this patient currently need?   1 = Unable, Total/Dependent Assistance  2 = A lot, Maximum/Moderate Assistance  3 = A little, Minimum/Contact Guard/Supervision  4 = None, Modified [place]/Independent    Turning over in bed (including adjusting bedclothes, sheets and blankets)?: 4  Sitting down on and standing up from a chair with arms (e.g., wheelchair, bedside commode, etc.): 3  Moving from lying on back to sitting on the side of the bed?: 4  Moving to and from a bed to a chair (including a wheelchair)?: 3  Need to walk in hospital room?: 3  Climbing 3-5 " steps with a railing?: 3  Total Score: 20    AM-PAC Raw Score CMS G-Code Modifier Level of Impairment Assistance   6 % Total / Unable   7 - 9 CM 80 - 100% Maximal Assist   10 - 14 CL 60 - 80% Moderate Assist   15 - 19 CK 40 - 60% Moderate Assist   20 - 22 CJ 20 - 40% Minimal Assist   23 CI 1-20% SBA / CGA   24 CH 0% Independent/ Mod I     Patient left up in chair with all lines intact, call button in reach and nurse notified.    Assessment:  Abbey Alvarado is a 71 y.o. female with a medical diagnosis of Weakness patient tolerated treatment session fairly well and progressing well towards goals. Patient was able to tolerate sitting in bedside chair for a few hours. Patient will cont. To benefit from skilled PT services to increase strength, endurance and functional mobility.     Rehab identified problem list/impairments: Rehab identified problem list/impairments: weakness, impaired endurance, pain    Rehab potential is good.    Activity tolerance: Good    Discharge recommendations: Discharge Facility/Level Of Care Needs: outpatient PT     Barriers to discharge: Barriers to Discharge: Inaccessible home environment, Decreased caregiver support    Equipment recommendations: Equipment Needed After Discharge: walker, rolling     GOALS:   Physical Therapy Goals        Problem: Physical Therapy Goal    Goal Priority Disciplines Outcome Goal Variances Interventions   Physical Therapy Goal     PT/OT, PT Ongoing (interventions implemented as appropriate)     Description:  Goals to be met by: 17     Patient will increase functional independence with mobility by performin. Supine to sit with Modified [place]  2. Sit to supine with Modified [place]  3. Sit to stand transfer with Modified [place]  4. Gait  x 150 feet with Modified Phelps using Rolling Walker  5.  Ascend and descend 3 steps with SPC and HHA/ CGA .                 PLAN:    Patient to be seen 6 x/week  to address the above  listed problems via gait training, therapeutic activities, therapeutic exercises  Plan of Care expires: 02/15/17  Plan of Care reviewed with: patient         Reyna M Asaf, PTA  01/18/2017

## 2017-01-18 NOTE — PLAN OF CARE
Problem: Patient Care Overview  Goal: Plan of Care Review  Outcome: Ongoing (interventions implemented as appropriate)  Pt rests comfortably throughout night, c/o pain as 10/10 once this shift. Pt reports moderate relief of back pain symptoms with IV Dilaudid. Flexeril given with moderate relief of back pain as well. VSS, hourly rounding performed and call light in reach. Will continue to monitor patient.

## 2017-01-19 LAB
POCT GLUCOSE: 105 MG/DL (ref 70–110)
POCT GLUCOSE: 106 MG/DL (ref 70–110)
POCT GLUCOSE: 89 MG/DL (ref 70–110)
POCT GLUCOSE: 89 MG/DL (ref 70–110)
POCT GLUCOSE: 90 MG/DL (ref 70–110)

## 2017-01-19 PROCEDURE — 25000003 PHARM REV CODE 250: Performed by: INTERNAL MEDICINE

## 2017-01-19 PROCEDURE — G8989 SELF CARE D/C STATUS: HCPCS | Mod: CJ

## 2017-01-19 PROCEDURE — 99232 SBSQ HOSP IP/OBS MODERATE 35: CPT | Mod: ,,, | Performed by: HOSPITALIST

## 2017-01-19 PROCEDURE — 97530 THERAPEUTIC ACTIVITIES: CPT

## 2017-01-19 PROCEDURE — 97116 GAIT TRAINING THERAPY: CPT

## 2017-01-19 PROCEDURE — 97110 THERAPEUTIC EXERCISES: CPT

## 2017-01-19 PROCEDURE — 11000001 HC ACUTE MED/SURG PRIVATE ROOM

## 2017-01-19 PROCEDURE — 97535 SELF CARE MNGMENT TRAINING: CPT

## 2017-01-19 PROCEDURE — 63600175 PHARM REV CODE 636 W HCPCS: Performed by: INTERNAL MEDICINE

## 2017-01-19 PROCEDURE — 25000003 PHARM REV CODE 250: Performed by: HOSPITALIST

## 2017-01-19 RX ADMIN — HYDROMORPHONE HYDROCHLORIDE 0.5 MG: 1 INJECTION, SOLUTION INTRAMUSCULAR; INTRAVENOUS; SUBCUTANEOUS at 09:01

## 2017-01-19 RX ADMIN — AMLODIPINE BESYLATE 10 MG: 5 TABLET ORAL at 08:01

## 2017-01-19 RX ADMIN — DULOXETINE HYDROCHLORIDE 20 MG: 20 CAPSULE, DELAYED RELEASE ORAL at 08:01

## 2017-01-19 RX ADMIN — PANTOPRAZOLE SODIUM 40 MG: 40 TABLET, DELAYED RELEASE ORAL at 08:01

## 2017-01-19 RX ADMIN — HYDROMORPHONE HYDROCHLORIDE 0.5 MG: 1 INJECTION, SOLUTION INTRAMUSCULAR; INTRAVENOUS; SUBCUTANEOUS at 03:01

## 2017-01-19 RX ADMIN — HYDROMORPHONE HYDROCHLORIDE 0.5 MG: 1 INJECTION, SOLUTION INTRAMUSCULAR; INTRAVENOUS; SUBCUTANEOUS at 08:01

## 2017-01-19 RX ADMIN — ENOXAPARIN SODIUM 40 MG: 100 INJECTION SUBCUTANEOUS at 11:01

## 2017-01-19 RX ADMIN — DULOXETINE HYDROCHLORIDE 20 MG: 20 CAPSULE, DELAYED RELEASE ORAL at 09:01

## 2017-01-19 NOTE — PLAN OF CARE
Problem: Patient Care Overview  Goal: Plan of Care Review  Outcome: Ongoing (interventions implemented as appropriate)  Pt remains free from injury or falls. Vital signs stable throughout night on room air. Positions self independently and up with stand by assistance. Telemetry maintained, pain managed with IV medications. Bed in low locked position and call light within reach. Will continue to monitor.

## 2017-01-19 NOTE — PLAN OF CARE
Problem: Patient Care Overview  Goal: Plan of Care Review  Outcome: Ongoing (interventions implemented as appropriate)  Patient free of falls or injury during shift. Positions self and ambulates independently. Pain moderately controlled with prn medication. Normal sinus rhythm on telemetry. Safety maintained. Bed low and locked, side rails x 2, call bell in reach. Patient resting comfortably in bed; no other complaints at this time. Will continue to monitor.

## 2017-01-19 NOTE — PT/OT/SLP PROGRESS
Occupational Therapy  Treatment    Abbey Alvarado   MRN: [id number]   Admitting Diagnosis: Weakness    OT Date of Treatment: 01/19/17   OT Start Time: 1408  OT Stop Time: 1425  OT Total Time (min): 17 min    Billable Minutes:  Self Care/Home Management 9, Therapeutic Activity 8 and Total Time 17    General Precautions: Standard, fall, anti-coagulation medicine, diabetic  Orthopedic Precautions: N/A  Braces: N/A    Do you have any cultural, spiritual, Christian conflicts, given your current situation?: No    Subjective:  Communicated with nurse prior to session.      Pain Rating: 10/10  Location - Side: Bilateral  Location - Orientation: generalized  Location: back  Pain Addressed: Pre-medicate for activity, Reposition, Distraction  Pain Rating Post-Intervention: 10/10    Objective:  Patient found with: telemetry     Functional Mobility:  Bed Mobility:       Transfers:   Sit <> Stand Assistance: Stand By Assistance  Sit <> Stand Assistive Device: Rolling Walker  Bed <> Chair Technique: Stand Pivot  Bed <> Chair Transfer Assistance: Stand By Assistance  Bed <> Chair Assistive Device: Rolling Walker  Toilet Transfer Assistance: Stand By Assistance  Toilet Transfer Assistive Device: Rolling Walker, bedside commode    Functional Ambulation: SBA with RW in room to closet for clothes retrieval; sink, bathroom door, BSC by sink, back to bed.  Unsteady but no LOB.    Activities of Daily Living:      LE Dressing Level of Assistance: Supervision (socks and pants)    Grooming Position: Standing at sink  Grooming Level of Assistance: Supervision  Toileting Where Assessed: Bedside commode  Toileting Level of Assistance: Stand by assistance            Balance:   Static Sit: NORMAL: No deviations seen in posture held statically  Dynamic Sit: GOOD: Maintains balance through MODERATE excursions of active trunk movement  Static Stand: GOOD-: Takes MODERATE challenges from all directions inconsistently  Dynamic stand: GOOD-: Needs  SUPERVISION only during gait and able to self right with moderate     Therapeutic Activities and Exercises:  Pt. Met grooming goals. Supervision standing at sink with RW; LB dressing supervision socks and pants; toileting SBA; BSC stand pivot father with RW SBA; mod vc for hand placement sit<>stand; pt. Reports unsteadiness; lacks saefty awareness.     AM-PAC 6 CLICK ADL   How much help from another person does this patient currently need?   1 = Unable, Total/Dependent Assistance  2 = A lot, Maximum/Moderate Assistance  3 = A little, Minimum/Contact Guard/Supervision  4 = None, Modified [place]/Independent    Putting on and taking off regular lower body clothing? : 3  Bathing (including washing, rinsing, drying)?: 3  Toileting, which includes using toilet, bedpan, or urinal? : 3  Putting on and taking off regular upper body clothing?: 4  Taking care of personal grooming such as brushing teeth?: 3  Eating meals?: 4  Total Score: 20     AM-PAC Raw Score CMS G-Code Modifier Level of Impairment Assistance   6 % Total / Unable   7 - 9 CM 80 - 100% Maximal Assist   10 - 14 CL 60 - 80% Moderate Assist   15 - 19 CK 40 - 60% Moderate Assist   20 - 22 CJ 20 - 40% Minimal Assist   23 CI 1-20% SBA / CGA   24 CH 0% Independent/ Mod I     Patient left seated EOB with all lines intact and call button in reach    ASSESSMENT:  Abbey Alvarado is a 71 y.o. female with a medical diagnosis of Weakness and presents with pt. Making good progress.  Met grooming goal. Continue with OT POC.    Rehab identified problem list/impairments: Rehab identified problem list/impairments: weakness, impaired functional mobilty, impaired self care skills, decreased safety awareness, gait instability, impaired balance    Rehab potential is good.    Activity tolerance: Good    Discharge recommendations: Discharge Facility/Level Of Care Needs: outpatient PT     Barriers to discharge: Barriers to Discharge: Inaccessible home environment,  Decreased caregiver support    Equipment recommendations: walker, rolling     GOALS:   Occupational Therapy Goals        Problem: Occupational Therapy Goal    Goal Priority Disciplines Outcome Interventions   Occupational Therapy Goal     OT, PT/OT Ongoing (interventions implemented as appropriate)    Description:  Goals to be met by: 01/21/17     Patient will increase functional independence with ADLs by performing:    LE Dressing with Supervision and RW, PRN.  Grooming while standing at sink with Supervision and RW, PRN. GOAl met 1/19/2017  Toileting from toilet with Supervision and RW, PRN, for hygiene and clothing management.   Stand pivot transfers with Supervision, RW PRN.  Toilet transfer to toilet with Supervision and RW, PRN.                 Plan:  Patient to be seen 5 x/week to address the above listed problems via self-care/home management, therapeutic activities, therapeutic exercises  Plan of Care expires:    Plan of Care reviewed with: patient         Katy SEBLE Mcadams  01/19/2017

## 2017-01-19 NOTE — PROGRESS NOTES
Progress Note  [facility] Medicine      Admit Date: 1/15/2017 (LOS: 2)    SUBJECTIVE:     Follow-up For:  Weakness    HPI/Interval history: Patient reports pain improving.    Review of Systems: Patient denies chest pain, shortness of breath.  No fevers, chills.  No nausea, vomiting, or diarrhea.    Medications: Reviewed and documented in the MAR.    OBJECTIVE:     Vital Signs (Most Recent)  Temp: 97.8 °F (36.6 °C) (01/19/17 0705)  Pulse: 66 (01/19/17 1000)  Resp: 16 (01/19/17 0705)  BP: 135/63 (01/19/17 0705)  SpO2: (!) 94 % (01/19/17 0705)    Vital Signs Range (Last 24H):  Temp:  [97.8 °F (36.6 °C)-98.1 °F (36.7 °C)]   Pulse:  [61-83]   Resp:  [16-18]   BP: (114-135)/(51-67)   SpO2:  [94 %-96 %]     I & O (Last 24H):    Intake/Output Summary (Last 24 hours) at 01/19/17 1044  Last data filed at 01/19/17 0848   Gross per 24 hour   Intake             1560 ml   Output             1450 ml   Net              110 ml       BMI: Body mass index is 25.83 kg/(m^2).    Physical Exam:  General: Patient is awake, alert, and oriented, and in no acute distress.  Eyes: Pupils equal, round, and reactive to light, anicteric sclera.  Mouth: No lesions, moist mucous membranes.  Respiratory: Lungs clear to ausculation, no crackles or wheezing.  Cardiovascular: Regular rate and rythmn, no murmurs appreciated.  Abdomen: Soft, non-tender, non-distended, normal bowel sounds.  Skin: No rashes or breakdown.  Extremities: No edema, cyanosis, or clubbing.  Neuro: No focal weakness.    Diagnostic Results:  CBC:    Recent Labs  Lab 01/17/17 0420 01/18/17 0419   WBC 5.58 6.68   HGB 12.0 12.7   HCT 37.0 38.1    287   MCV 91 91     BMP:    Recent Labs  Lab 01/17/17 0420 01/18/17  0418   GLU 86 94    137   K 3.9 3.9    104   CO2 22* 22*   BUN 6* 7*   CREATININE 0.7 0.8   CALCIUM 8.9 9.2   MG 1.8 2.0     MRI:  Lumbar spine: Heterogeneous mild edema signal and enhancement within the inferior L2 and superior L3 vertebral end plates  with trace disc fluid. While there is bulging disc at this level and likely degenerative process cannot exclude superimposed component of Spondylodiscitis. Clinical correlation advised. No evidence for epidural collection to suggest abscess with thin epidural enhancement likely reactive.    TSH within normal limits.  RPR non-reactive    ASSESSMENT/PLAN:     Active Hospital Problems    Diagnosis  POA    *Weakness [R53.1]  Yes    Falls [W19.XXXA]  Yes    Hypokalemia [E87.6]  Yes    Hypomagnesemia [E83.42]  Yes    Hypophosphatemia [E83.39]  Yes    Weakness of right lower extremity [R29.898]  Yes    Depression [F32.9]  Yes    Controlled type 2 diabetes mellitus without complication [E11.9]  Yes    Hypertension associated with diabetes [E11.59, I10]  Yes      Resolved Hospital Problems    Diagnosis Date Resolved POA   No resolved problems to display.       Weakness of right lower extremity with acute on chronic lower back pain.  Orthopedic surgery consulted.  Concern for discitis low.  Awaiting there input. Otherwise continue with conservative measures and wean intravenous pain medication as possible.    Hypertension.  Reasonably controlled with current regimen.  Will continue with current regimen and continue to monitor.    Diabetes mellitus type II.  Well controlled.  Continue with diabetic diet.      Depression.  Stable.  Continue with home regimen.    DVT prophylaxis.  Subcutaneous enoxaparin.

## 2017-01-19 NOTE — PT/OT/SLP PROGRESS
Physical Therapy  Treatment    Abbey Alvarado   MRN: [id number]   Admitting Diagnosis: Weakness    PT Received On: 01/19/17  PT Start Time: 0842     PT Stop Time: 0906    PT Total Time (min): 24 min       Billable Minutes:  Gait Training8, Therapeutic Activity 8 and Therapeutic Exercise 8    Treatment Type: Treatment  PT/PTA: PTA     PTA Visit Number: 2       General Precautions: Standard, anti-coagulation medicine, diabetic, fall  Orthopedic Precautions: N/A   Braces: N/A    Do you have any cultural, spiritual, Restorationism conflicts, given your current situation?: no    Subjective:  Communicated with nurse prior to session. Nurse reported that patient had just had IV pain medication.    Pain Rating:  (patient complained of 10/10 low back pain)     Objective:   Patient found with: peripheral IV, telemetry supine in bed     Functional Mobility:  Bed Mobility:   Rolling/Turning Right: Independent  Scooting/Bridging: Independent  Supine to Sit: Independent    Transfers:  Sit <> Stand Assistance: Independent  Sit <> Stand Assistive Device: No Assistive Device    Gait: SBA for safety   Gait Distance: 275 feet   Gait Assistive Device: Rolling walker  Gait Pattern: reciprocal  Gait Deviation(s): decreased christian, decreased step length    Stairs:  Patient refused stair training and reported she can do that    Therapeutic Activities and Exercises:  Pt. Performed AP, LAQ, Hip Flexion, Hip Abd/Add X 20 reps  B LE's     AM-PAC 6 CLICK MOBILITY  How much help from another person does this patient currently need?   1 = Unable, Total/Dependent Assistance  2 = A lot, Maximum/Moderate Assistance  3 = A little, Minimum/Contact Guard/Supervision  4 = None, Modified [place]/Independent    Turning over in bed (including adjusting bedclothes, sheets and blankets)?: 4  Sitting down on and standing up from a chair with arms (e.g., wheelchair, bedside commode, etc.): 4  Moving from lying on back to sitting on the side of the bed?:  4  Moving to and from a bed to a chair (including a wheelchair)?: 4  Need to walk in hospital room?: 3  Climbing 3-5 steps with a railing?: 3  Total Score: 22    AM-PAC Raw Score CMS G-Code Modifier Level of Impairment Assistance   6 % Total / Unable   7 - 9 CM 80 - 100% Maximal Assist   10 - 14 CL 60 - 80% Moderate Assist   15 - 19 CK 40 - 60% Moderate Assist   20 - 22 CJ 20 - 40% Minimal Assist   23 CI 1-20% SBA / CGA   24 CH 0% Independent/ Mod I     Patient left up in chair with all lines intact, call button in reach and nurse notified.    Assessment:  Abbey Alvarado is a 71 y.o. female with a medical diagnosis of Weakness patient tolerated treatment session well and increase with gait distance. Patient will cont. To benefit from skilled PT services to increase strength, endurance and functional mobility.     Rehab identified problem list/impairments: Rehab identified problem list/impairments: weakness, impaired endurance, pain    Rehab potential is good.    Activity tolerance: Good    Discharge recommendations: Discharge Facility/Level Of Care Needs: outpatient PT     Barriers to discharge: Barriers to Discharge: Inaccessible home environment, Decreased caregiver support    Equipment recommendations: Equipment Needed After Discharge: walker, rolling     GOALS:   Physical Therapy Goals        Problem: Physical Therapy Goal    Goal Priority Disciplines Outcome Goal Variances Interventions   Physical Therapy Goal     PT/OT, PT Ongoing (interventions implemented as appropriate)     Description:  Goals to be met by: 17     Patient will increase functional independence with mobility by performin. Supine to sit with Modified [place]  2. Sit to supine with Modified [place]  3. Sit to stand transfer with Modified [place]  4. Gait  x 150 feet with Modified [place] using Rolling Walker  5.  Ascend and descend 3 steps with SPC and HHA/ CGA .                 PLAN:    Patient to be  seen 6 x/week  to address the above listed problems via gait training, therapeutic activities, therapeutic exercises  Plan of Care expires: 02/15/17  Plan of Care reviewed with: patient         Reyna M Asaf, PTA  01/19/2017

## 2017-01-19 NOTE — PLAN OF CARE
Problem: Occupational Therapy Goal  Goal: Occupational Therapy Goal  Goals to be met by: 01/21/17     Patient will increase functional independence with ADLs by performing:    LE Dressing with Supervision and RW, PRN.  Grooming while standing at sink with Supervision and RW, PRN. GOAl met 1/19/2017  Toileting from toilet with Supervision and RW, PRN, for hygiene and clothing management.   Stand pivot transfers with Supervision, RW PRN.  Toilet transfer to toilet with Supervision and RW, PRN.   Outcome: Ongoing (interventions implemented as appropriate)  Pt. Met grooming goals.  Supervision standing at sink with RW; LB dressing supervision socks and pants; toileting SBA; BSC stand pivot father with RW SBA; mod vc for hand placement sit<>stand; pt. Reports unsteadiness; lacks saefty awareness.  Conitnue with OT POC.

## 2017-01-20 VITALS
OXYGEN SATURATION: 95 % | RESPIRATION RATE: 16 BRPM | SYSTOLIC BLOOD PRESSURE: 120 MMHG | WEIGHT: 169.06 LBS | HEIGHT: 67 IN | DIASTOLIC BLOOD PRESSURE: 63 MMHG | HEART RATE: 68 BPM | BODY MASS INDEX: 26.53 KG/M2 | TEMPERATURE: 98 F

## 2017-01-20 PROBLEM — M47.25 OSTEOARTHRITIS OF SPINE WITH RADICULOPATHY, THORACOLUMBAR REGION: Status: ACTIVE | Noted: 2017-01-20

## 2017-01-20 LAB
ANION GAP SERPL CALC-SCNC: 6 MMOL/L
BASOPHILS # BLD AUTO: 0.02 K/UL
BASOPHILS NFR BLD: 0.3 %
BUN SERPL-MCNC: 10 MG/DL
CALCIUM SERPL-MCNC: 8.9 MG/DL
CHLORIDE SERPL-SCNC: 108 MMOL/L
CO2 SERPL-SCNC: 25 MMOL/L
CREAT SERPL-MCNC: 0.8 MG/DL
DIFFERENTIAL METHOD: ABNORMAL
EOSINOPHIL # BLD AUTO: 0.1 K/UL
EOSINOPHIL NFR BLD: 1.8 %
ERYTHROCYTE [DISTWIDTH] IN BLOOD BY AUTOMATED COUNT: 12.6 %
EST. GFR  (AFRICAN AMERICAN): >60 ML/MIN/1.73 M^2
EST. GFR  (NON AFRICAN AMERICAN): >60 ML/MIN/1.73 M^2
GLUCOSE SERPL-MCNC: 94 MG/DL
HCT VFR BLD AUTO: 38.3 %
HGB BLD-MCNC: 12.2 G/DL
LYMPHOCYTES # BLD AUTO: 2.4 K/UL
LYMPHOCYTES NFR BLD: 32.7 %
MAGNESIUM SERPL-MCNC: 1.8 MG/DL
MCH RBC QN AUTO: 29.3 PG
MCHC RBC AUTO-ENTMCNC: 31.9 %
MCV RBC AUTO: 92 FL
MONOCYTES # BLD AUTO: 0.7 K/UL
MONOCYTES NFR BLD: 9.3 %
NEUTROPHILS # BLD AUTO: 4.1 K/UL
NEUTROPHILS NFR BLD: 55.4 %
PHOSPHATE SERPL-MCNC: 2.9 MG/DL
PLATELET # BLD AUTO: 299 K/UL
PMV BLD AUTO: 9.9 FL
POCT GLUCOSE: 116 MG/DL (ref 70–110)
POCT GLUCOSE: 130 MG/DL (ref 70–110)
POTASSIUM SERPL-SCNC: 4.1 MMOL/L
RBC # BLD AUTO: 4.16 M/UL
SODIUM SERPL-SCNC: 139 MMOL/L
WBC # BLD AUTO: 7.4 K/UL

## 2017-01-20 PROCEDURE — 84100 ASSAY OF PHOSPHORUS: CPT

## 2017-01-20 PROCEDURE — 80048 BASIC METABOLIC PNL TOTAL CA: CPT

## 2017-01-20 PROCEDURE — 36415 COLL VENOUS BLD VENIPUNCTURE: CPT

## 2017-01-20 PROCEDURE — 97116 GAIT TRAINING THERAPY: CPT

## 2017-01-20 PROCEDURE — 25000003 PHARM REV CODE 250: Performed by: HOSPITALIST

## 2017-01-20 PROCEDURE — 83735 ASSAY OF MAGNESIUM: CPT

## 2017-01-20 PROCEDURE — 25000003 PHARM REV CODE 250: Performed by: INTERNAL MEDICINE

## 2017-01-20 PROCEDURE — 97110 THERAPEUTIC EXERCISES: CPT

## 2017-01-20 PROCEDURE — 99239 HOSP IP/OBS DSCHRG MGMT >30: CPT | Mod: ,,, | Performed by: HOSPITALIST

## 2017-01-20 PROCEDURE — 85025 COMPLETE CBC W/AUTO DIFF WBC: CPT

## 2017-01-20 RX ORDER — CYCLOBENZAPRINE HCL 5 MG
5 TABLET ORAL 3 TIMES DAILY PRN
Qty: 30 TABLET | Refills: 0 | Status: SHIPPED | OUTPATIENT
Start: 2017-01-20 | End: 2017-01-30

## 2017-01-20 RX ADMIN — PANTOPRAZOLE SODIUM 40 MG: 40 TABLET, DELAYED RELEASE ORAL at 09:01

## 2017-01-20 RX ADMIN — HYDROCODONE BITARTRATE AND ACETAMINOPHEN 1 TABLET: 5; 325 TABLET ORAL at 01:01

## 2017-01-20 RX ADMIN — AMLODIPINE BESYLATE 10 MG: 5 TABLET ORAL at 09:01

## 2017-01-20 RX ADMIN — HYDROMORPHONE HYDROCHLORIDE 0.5 MG: 1 INJECTION, SOLUTION INTRAMUSCULAR; INTRAVENOUS; SUBCUTANEOUS at 03:01

## 2017-01-20 RX ADMIN — HYDROCODONE BITARTRATE AND ACETAMINOPHEN 1 TABLET: 5; 325 TABLET ORAL at 09:01

## 2017-01-20 RX ADMIN — DULOXETINE HYDROCHLORIDE 20 MG: 20 CAPSULE, DELAYED RELEASE ORAL at 09:01

## 2017-01-20 NOTE — CONSULTS
Full consult dictated. Patient with severe lumbar stenosis that has failed non-op treatment thus far, including two ESIs in the last 6 weeks, recommend fu with spine surgeon for lumbar decompression and fusion surgery, I gave patient names of 2 spine surgeons in [place]. Prior to dc home later today, if able to get another IR consult for repeat ROCIO, I would recommend this as a temporizing measure until surgery. Otherwise, no further issues to prevent dc home.

## 2017-01-20 NOTE — CONSULTS
DATE OF CONSULTATION:  01/20/2017    ORTHOPEDIC SURGERY CONSULTATION    CHIEF COMPLAINT:  Lumbar pain and lumbar stenosis.    BRIEF HISTORY:  This is a 71-year-old female with longstanding 20-year history   of low back problems and pain.  She has been treated conservatively by her   orthopedist, Dr. Gurinder López in [place], [place] for the last several   years.  She has intermittent back pain that will last for 2 to 3 weeks at a time   and has been treated with multiple medications, physical therapy and multiple   epidural steroid injections, the last which were 2 epidural steroid injections   within the last 6 weeks.  She has been admitted to the hospital for some right   lower extremity weakness and persistent pain.  Orthopedics was consulted for   evaluation and the other measures that could be taken while she is in the   hospital.    PAST MEDICAL HISTORY:  Significant for weakness and falls for last month,   diabetes, hiatal hernia, hypertension and stomach ulcers.    PAST SURGICAL HISTORY:  Significant for shoulder surgery and cholecystectomy.    ALLERGIES:  SIGNIFICANT FOR PERCOCET AND ULTRAM.    MEDICATIONS:  Please see med rec list.    FAMILY HISTORY:  Noncontributory.    SOCIAL HISTORY:  Nonsmoker, nondrinker.  No alcohol use.  Retired female.    PHYSICAL EXAMINATION:  GENERAL:  Alert and oriented, in no acute distress, very pleasant female,   complaining of pain in her low back.  EXTREMITIES:  No gross tenderness.  Palpation along the low back notes   significant spasm.  No swelling or erythema noted.  She is negative on straight   leg raising testing today.  She does have 4/5 strength of her hip flexor on the   right compared to 5/5 on the left.  All other muscle groups in bilateral lower   extremities are 5/5 and normal reflexes throughout.  Negative for clonus.    Negative Babinski.    DIAGNOSTIC STUDIES:  MRI and x-rays are reviewed, which show diffuse lumbar disk   disease and stenosis with  spondylosis of the spine causing significant nerve   root impingement at multiple levels.    ASSESSMENT:  A 71-year-old female with lumbar stenosis.    PLAN:  For temporizing measures, would recommend a repeat epidural steroid   injection if possible while she is in the hospital today before she is   discharged.  Otherwise, she needs to follow up with a spine surgeon.  I gave her   the names of a couple spine surgeons here and wants for her to follow up with   because she has failed all conservative measures, she likely is in need of a   lumbar decompression and fusion.      SAT/  dd: 01/20/2017 11:25:10 (CST)  td: 01/20/2017 12:41:05 (CST)  Doc ID   #[id number]  Job ID #[id number]    CC: Gurinder López

## 2017-01-20 NOTE — DISCHARGE SUMMARY
[facility]  Discharge Summary      Admit Date: 1/15/2017    Discharge Date and Time: 1/20/2017 10:13 AM    Attending Physician: Nilo Layton MD     Reason for Admission: Osteoarthritis of spine with radiculopathy, thoracolumbar region    Hospital Course:  Ms. Abbey Alvarado is a 71-year-old woman with chronic lower back pain due to osteoarthritis of spine who has been under the care of Dr. Gurinder López who has been giving her spinal injections with some relief who presented to [facility] with worsening pain following a fall.  There was some question about possible right-sided weakness for which she was transferred to [facility] for evaluation by a neurologist.  While here she underwent an MRI of the lower spine which did reveal mild edema signal and enhancement within the inferior L2 and superior L3 of the vertebral end plates consistent with inflammation due to degenerative process but not excluded superimposed spondylodiscitis.  In light of the fact that patient is afebrile, with normal white blood count, and CRP and sed rate only mildly elevated to 15 mg/L and 27 mm/Hr respectively, infectious process seems less likely.       Per my examination, patient without any focal weakness.  Patient also able to ambulate on her own.  Dr. Ramses Casey (neurology) was consulted and evaluated and examined Ms. Alvarado and his impression is that her weakness and pain are due to lumbar spinal disease associated with radiculopathy.  Dr. Casey recommended that the patient follow-up with her orthopedic surgeon Dr. Gurinder López next week.  Please see his consultation note for further details.     Patient also evaluated by orthopedic surgery Dr. Bill Bryan also recommends outpatient follow-up for consideration of elective surgery.  Physical therapy worked with patient and recommended outpatient therapy.  Patient however expressed concern that Dr. López had previously recommended  against therapy.  As such, I have advised her to see Dr. López to reconsider whether or not to continue with therapy.    Patient also interested in seeking a second opinion.  In terms of providing a second opinion, I have placed an order for an ambulatory referral to orthopedic surgery at [facility].  I have also placed a referral to our pain management clinic as well.    Consults: Neurology    Final Diagnoses:    Principal Problem: Osteoarthritis of spine with radiculopathy, thoracolumbar region   Secondary Diagnoses:   Active Hospital Problems    Diagnosis  POA    *Osteoarthritis of spine with radiculopathy, thoracolumbar region [M47.25]  Yes    Falls [W19.XXXA]  Yes    Hypokalemia [E87.6]  Yes    Hypomagnesemia [E83.42]  Yes    Hypophosphatemia [E83.39]  Yes    Weakness [R53.1]  Yes    Weakness of right lower extremity [R29.898]  Yes    Depression [F32.9]  Yes    Controlled type 2 diabetes mellitus without complication [E11.9]  Yes    Hypertension associated with diabetes [E11.59, I10]  Yes      Resolved Hospital Problems    Diagnosis Date Resolved POA   No resolved problems to display.       Discharged Condition: Stable    Disposition: Home or Self Care    Follow Up/Patient Instructions:     Medications:  Reconciled Home Medications:   Current Discharge Medication List      START taking these medications    Details   cyclobenzaprine (FLEXERIL) 5 MG tablet Take 1 tablet (5 mg total) by mouth 3 (three) times daily as needed for Muscle spasms.  Qty: 30 tablet, Refills: 0         CONTINUE these medications which have NOT CHANGED    Details   amlodipine (NORVASC) 10 MG tablet Take 10 mg by mouth once daily.      duloxetine (CYMBALTA) 20 MG capsule Take 1 capsule (20 mg total) by mouth 2 (two) times daily.  Qty: 60 capsule, Refills: 11      hydrocodone-acetaminophen 10-325mg (NORCO)  mg Tab Take 1 tablet by mouth every 4 (four) hours as needed for Pain.      omeprazole (PRILOSEC) 40 MG capsule Take 40  mg by mouth once daily.         STOP taking these medications       clonazePAM (KLONOPIN) 1 MG tablet Comments:   Reason for Stopping:         metformin (GLUCOPHAGE-XR) 500 MG 24 hr tablet Comments:   Reason for Stopping:               Discharge Procedure Orders  Ambulatory consult to Pain Clinic   Referral Priority: Urgent Referral Type: Consultation   Referral Reason: Specialty Services Required    Requested Specialty: Pain Medicine    Number of Visits Requested: 1      Ambulatory consult to Orthopedics   Referral Priority: Urgent Referral Type: Consultation   Number of Visits Requested: 1      Diet Diabetic 1800 Calories     Activity as tolerated     Call MD for:  temperature >100.4     Call MD for:  persistent nausea and vomiting or diarrhea     Call MD for:  severe uncontrolled pain     Call MD for:  difficulty breathing or increased cough     Call MD for:  severe persistent headache     Call MD for:  worsening rash     Call MD for:  persistent dizziness, light-headedness, or visual disturbances     Call MD for:  increased confusion or weakness       Follow-up Information     Follow up with Debo Hensley MD. Schedule an appointment as soon as possible for a visit in 1 week.    Specialty:  Family Medicine    Contact information:    [address] [place] [address]  [place] [address] [phone]          Follow up with Gurinder López MD. Schedule an appointment as soon as possible for a visit in 3 days.    Specialty:  Orthopedic Surgery    Contact information:    [address] [address] [facility]  [place] [id number] [phone]          Time: 40 minutes spent coordinating and completing discharge.

## 2017-01-20 NOTE — DISCHARGE INSTRUCTIONS
Your feedback is important to us.  If you should receive a survey in the next few days, please share your experience with us.

## 2017-01-20 NOTE — PLAN OF CARE
Problem: Physical Therapy Goal  Goal: Physical Therapy Goal  Goals to be met by: 17     Patient will increase functional independence with mobility by performin. Supine to sit with Modified [place] MET 17  2. Sit to supine with Modified [place] MET 17  3. Sit to stand transfer with Modified [place] 17  4. Gait x 150 feet with Modified [place] using Rolling Walker  5. Ascend and descend 3 steps with SPC and HHA/ CGA .    Outcome: Ongoing (interventions implemented as appropriate)     Progressing well towards goals

## 2017-01-20 NOTE — PLAN OF CARE
Problem: Patient Care Overview  Goal: Plan of Care Review  Outcome: Outcome(s) achieved Date Met:  01/20/17  Pt free of trauma, falls, and injury. Pt VSS and afebrile throughout shift. Pt free of skin breakdown. Pt pain has been moderately controlled by PO pain meds and tolerated well. Pt has been eating and voiding adequately throughout shift. Pt discharge paperwork given and verbalized understanding. Pt IV removed with catheter tip intact, skin cdi. Pt discharged home with prescription and disc in no distress.

## 2017-01-20 NOTE — PT/OT/SLP PROGRESS
"Physical Therapy  Treatment    Abbey Alvarado   MRN: [id number]   Admitting Diagnosis: Osteoarthritis of spine with radiculopathy, thoracolumbar region    PT Received On: 01/20/17  PT Start Time: 1025     PT Stop Time: 1050    PT Total Time (min): 25 min       Billable Minutes:  Gait Spgnptoz15 and Therapeutic Exercise 10    Treatment Type: Treatment  PT/PTA: PTA     PTA Visit Number: 3       General Precautions: Standard, fall, anti-coagulation medicine  Orthopedic Precautions: N/A   Braces: N/A    Do you have any cultural, spiritual, Yazdanism conflicts, given your current situation?: no    Subjective:  Communicated with nurse prior to session. Pt. Stated " I want to watch television" I'm leaving today. Required encouragement to participate.     Pain Rating:  (patient complained of pain but never rated pain in low back )     Objective:   Patient found with: telemetry supine in bed     Functional Mobility:  Bed Mobility:   Rolling/Turning Right: Independent  Scooting/Bridging: Independent  Supine to Sit: Independent    Transfers:  Sit <> Stand Assistance: Independent  Sit <> Stand Assistive Device: No Assistive Device    Gait: SBA for safety and verbal cues with turns so feet do not cross over one another  Gait Distance: 250  Gait Assistive Device: Rolling walker  Gait Pattern: reciprocal  Gait Deviation(s): decreased christian, decreased step length (narrow JORDIN )    Stairs:  Refused reported I can do those    Therapeutic Activities and Exercises:  Pt. Performed AP, LAQ, Hip Flexion, Hip Abd/Add X 20 reps B LE AROM    AM-PAC 6 CLICK MOBILITY  How much help from another person does this patient currently need?   1 = Unable, Total/Dependent Assistance  2 = A lot, Maximum/Moderate Assistance  3 = A little, Minimum/Contact Guard/Supervision  4 = None, Modified [place]/Independent    Turning over in bed (including adjusting bedclothes, sheets and blankets)?: 4  Sitting down on and standing up from a chair with arms " (e.g., wheelchair, bedside commode, etc.): 4  Moving from lying on back to sitting on the side of the bed?: 4  Moving to and from a bed to a chair (including a wheelchair)?: 4  Need to walk in hospital room?: 3  Climbing 3-5 steps with a railing?: 3  Total Score: 22    AM-PAC Raw Score CMS G-Code Modifier Level of Impairment Assistance   6 % Total / Unable   7 - 9 CM 80 - 100% Maximal Assist   10 - 14 CL 60 - 80% Moderate Assist   15 - 19 CK 40 - 60% Moderate Assist   20 - 22 CJ 20 - 40% Minimal Assist   23 CI 1-20% SBA / CGA   24 CH 0% Independent/ Mod I     Patient left up in chair with all lines intact, call button in reach and nurse notified.    Assessment:  Abbey Alvarado is a 71 y.o. female with a medical diagnosis of Osteoarthritis of spine with radiculopathy, thoracolumbar region patient is progressing well towards goals and had no complaints pain while working with treatment session. Patient will benefit from OP PT     Rehab identified problem list/impairments: Rehab identified problem list/impairments: weakness    Rehab potential is good.    Activity tolerance: Good    Discharge recommendations: Discharge Facility/Level Of Care Needs: outpatient PT     Barriers to discharge: Barriers to Discharge: Inaccessible home environment, Decreased caregiver support    Equipment recommendations: Equipment Needed After Discharge: walker, rolling     GOALS:   Physical Therapy Goals        Problem: Physical Therapy Goal    Goal Priority Disciplines Outcome Goal Variances Interventions   Physical Therapy Goal     PT/OT, PT Ongoing (interventions implemented as appropriate)     Description:  Goals to be met by: 17     Patient will increase functional independence with mobility by performin. Supine to sit with Modified [place] MET 17  2. Sit to supine with Modified [place] MET 17  3. Sit to stand transfer with Modified [place] 17  4. Gait  x 150 feet with Modified  [place] using Rolling Walker  5.  Ascend and descend 3 steps with SPC and HHA/ CGA .                  PLAN:    Patient to be seen 6 x/week  to address the above listed problems via gait training, therapeutic activities, therapeutic exercises  Plan of Care expires: 02/15/17  Plan of Care reviewed with: patient         Reyna Ron, PTA  01/20/2017

## 2017-01-20 NOTE — PROGRESS NOTES
Physical Therapy Discharge Summary    Abbey Alvarado  MRN: [id number]   Osteoarthritis of spine with radiculopathy, thoracolumbar region   Patient Discharged from acute Physical Therapy on 2017.  Please refer to prior PT noted date on 2017 for functional status.     Assessment:   Goals partially met. Secondary to hospital discharge.   GOALS:   Physical Therapy Goals        Problem: Physical Therapy Goal    Goal Priority Disciplines Outcome Goal Variances Interventions   Physical Therapy Goal     PT/OT, PT Ongoing (interventions implemented as appropriate)     Description:  Goals to be met by: 17     Patient will increase functional independence with mobility by performin. Supine to sit with Modified [place] MET 17  2. Sit to supine with Modified [place] MET 17  3. Sit to stand transfer with Modified [place] 17  4. Gait  x 150 feet with Modified [place] using Rolling Walker  5.  Ascend and descend 3 steps with SPC and HHA/ CGA .                Reasons for Discontinuation of Therapy Services  Transfer to alternate level of care.      Plan:  Patient Discharged to: Home with Home Health Service.  PT of record not available for documentation.

## 2017-01-21 NOTE — PT/OT/SLP DISCHARGE
Occupational Therapy Discharge Summary    Abbey Alvarado  MRN: [id number]   Osteoarthritis of spine with radiculopathy, thoracolumbar region   Patient Discharged from acute Occupational Therapy on 1/20/17.  Please refer to prior OT note dated on 1/19/17 for functional status.     Assessment:   Patient appropriate for care in another setting.  GOALS:   Occupational Therapy Goals        Problem: Occupational Therapy Goal    Goal Priority Disciplines Outcome Interventions   Occupational Therapy Goal     OT, PT/OT Ongoing (interventions implemented as appropriate)    Description:  Goals to be met by: 01/21/17     Patient will increase functional independence with ADLs by performing:    LE Dressing with Supervision and RW, PRN.  Grooming while standing at sink with Supervision and RW, PRN. GOAl met 1/19/2017  Toileting from toilet with Supervision and RW, PRN, for hygiene and clothing management.   Stand pivot transfers with Supervision, RW PRN.  Toilet transfer to toilet with Supervision and RW, PRN.               Reasons for Discontinuation of Therapy Services  Transfer to alternate level of care.      Plan:  Patient Discharged to: Out-Pt PT planned.BARB Villeda 1/21/2017

## 2017-02-27 PROBLEM — R79.89 ELEVATED BRAIN NATRIURETIC PEPTIDE (BNP) LEVEL: Status: ACTIVE | Noted: 2017-02-27

## 2017-02-27 PROBLEM — I48.91 ATRIAL FIBRILLATION WITH RVR: Status: ACTIVE | Noted: 2017-02-27

## 2017-02-28 PROBLEM — G89.29 CHRONIC LOW BACK PAIN WITH SCIATICA: Status: ACTIVE | Noted: 2017-02-28

## 2017-02-28 PROBLEM — M54.40 CHRONIC LOW BACK PAIN WITH SCIATICA: Status: ACTIVE | Noted: 2017-02-28

## 2017-03-01 ENCOUNTER — TELEPHONE (OUTPATIENT)
Dept: CARDIOLOGY | Facility: CLINIC | Age: 72
End: 2017-03-01

## 2017-04-19 ENCOUNTER — OFFICE VISIT (OUTPATIENT)
Dept: CARDIOLOGY | Facility: CLINIC | Age: 72
End: 2017-04-19
Payer: MEDICARE

## 2017-04-19 VITALS
WEIGHT: 160.69 LBS | HEIGHT: 67 IN | SYSTOLIC BLOOD PRESSURE: 120 MMHG | DIASTOLIC BLOOD PRESSURE: 63 MMHG | BODY MASS INDEX: 25.22 KG/M2 | HEART RATE: 61 BPM

## 2017-04-19 DIAGNOSIS — E11.59 HYPERTENSION ASSOCIATED WITH DIABETES: ICD-10-CM

## 2017-04-19 DIAGNOSIS — I48.0 PAF (PAROXYSMAL ATRIAL FIBRILLATION): Primary | ICD-10-CM

## 2017-04-19 DIAGNOSIS — I35.0 NONRHEUMATIC AORTIC VALVE STENOSIS: ICD-10-CM

## 2017-04-19 DIAGNOSIS — E83.42 HYPOMAGNESEMIA: ICD-10-CM

## 2017-04-19 DIAGNOSIS — I15.2 HYPERTENSION ASSOCIATED WITH DIABETES: ICD-10-CM

## 2017-04-19 PROCEDURE — 99213 OFFICE O/P EST LOW 20 MIN: CPT | Mod: PBBFAC,PO | Performed by: INTERNAL MEDICINE

## 2017-04-19 PROCEDURE — 99213 OFFICE O/P EST LOW 20 MIN: CPT | Mod: S$PBB,,, | Performed by: INTERNAL MEDICINE

## 2017-04-19 PROCEDURE — 99999 PR PBB SHADOW E&M-EST. PATIENT-LVL III: CPT | Mod: PBBFAC,,, | Performed by: INTERNAL MEDICINE

## 2017-04-19 RX ORDER — METOPROLOL SUCCINATE 50 MG/1
50 TABLET, EXTENDED RELEASE ORAL EVERY MORNING
Qty: 90 TABLET | Refills: 3 | Status: SHIPPED | OUTPATIENT
Start: 2017-04-19 | End: 2018-05-12 | Stop reason: SDUPTHER

## 2017-04-19 RX ORDER — LORAZEPAM 0.5 MG/1
0.5 TABLET ORAL EVERY 6 HOURS PRN
COMMUNITY
End: 2017-11-15

## 2017-04-19 RX ORDER — AMLODIPINE BESYLATE 10 MG/1
10 TABLET ORAL DAILY
COMMUNITY
End: 2017-04-19 | Stop reason: SDUPTHER

## 2017-04-19 RX ORDER — AMLODIPINE BESYLATE 5 MG/1
5 TABLET ORAL NIGHTLY
Qty: 90 TABLET | Refills: 5 | Status: SHIPPED | OUTPATIENT
Start: 2017-04-19

## 2017-04-19 NOTE — PROGRESS NOTES
Subjective:    Patient ID:  Abbey Alvarado is a 71 y.o. female who presents for follow-up of Atrial Fibrillation (f/u post hospital w/holter results)      HPI   Here for follow up of recent hospital stay due to AF-RVR with DCCV/AS. Had 1 episode of fast hr seen at local ED since then NSR. Mild LE edema    Review of Systems   Constitution: Negative for malaise/fatigue.   Eyes: Negative for blurred vision.   Cardiovascular: Negative for chest pain, claudication, cyanosis, dyspnea on exertion, irregular heartbeat, leg swelling, near-syncope, orthopnea, palpitations, paroxysmal nocturnal dyspnea and syncope.   Respiratory: Negative for cough and shortness of breath.    Hematologic/Lymphatic: Does not bruise/bleed easily.   Musculoskeletal: Negative for back pain, falls, joint pain, muscle cramps, muscle weakness and myalgias.   Gastrointestinal: Negative for abdominal pain, change in bowel habit, nausea and vomiting.   Genitourinary: Negative for urgency.   Neurological: Negative for dizziness, focal weakness and light-headedness.        Objective:    Physical Exam   Constitutional: She is oriented to person, place, and time. She appears well-developed and well-nourished.   Neck: Normal range of motion. No JVD present.   Cardiovascular: Normal rate, regular rhythm, normal heart sounds and intact distal pulses.    Pulmonary/Chest: Effort normal and breath sounds normal.   Neurological: She is alert and oriented to person, place, and time.   Skin: Skin is warm and dry.   Psychiatric: She has a normal mood and affect.   Nursing note and vitals reviewed.            ..    Chemistry        Component Value Date/Time     02/28/2017 0522    K 3.6 02/28/2017 0522     02/28/2017 0522    CO2 24 02/28/2017 0522    BUN 13 02/28/2017 0522    CREATININE 0.62 02/28/2017 0522     02/28/2017 0522        Component Value Date/Time    CALCIUM 8.4 02/28/2017 0522    ALKPHOS 94 02/27/2017 1829    AST 27 02/27/2017 1829     ALT 35 02/27/2017 1829    BILITOT 0.4 02/27/2017 1829            ..  Lab Results   Component Value Date    CHOL 202 (H) 02/28/2017    CHOL 197 01/16/2017     Lab Results   Component Value Date    HDL 54 02/28/2017    HDL 47 01/16/2017     Lab Results   Component Value Date    LDLCALC 102.2 02/28/2017    LDLCALC 121.4 01/16/2017     Lab Results   Component Value Date    TRIG 229 (H) 02/28/2017    TRIG 143 01/16/2017     Lab Results   Component Value Date    CHOLHDL 26.7 02/28/2017    CHOLHDL 23.9 01/16/2017     ..  Lab Results   Component Value Date    WBC 9.40 02/28/2017    HGB 13.2 02/28/2017    HCT 39.7 02/28/2017    MCV 91 02/28/2017     02/28/2017       Test(s) Reviewed  I have reviewed the following in detail:  [x] Stress test   [] Angiography   [x] Echocardiogram   [x] Labs   [x] Other:       Assessment:         ICD-10-CM ICD-9-CM   1. PAF (paroxysmal atrial fibrillation) I48.0 427.31   2. Hypertension associated with diabetes E11.59 250.80    I10 401.9   3. Hypomagnesemia E83.42 275.2     Problem List Items Addressed This Visit     Hypertension associated with diabetes    Hypomagnesemia    PAF (paroxysmal atrial fibrillation) - Primary    Overview     2/17 DCC                Plan:           Return to clinic 9 months   Low level/low impact aerobic exercise 5x's/wk. Heart healthy diet and risk factor modification.    See labs and med orders.  CFD  Decrease norvasc change to pm follow bp

## 2017-04-19 NOTE — MR AVS SNAPSHOT
[facility]  [address] [facility] [address]  [facility] [address]  Phone: [phone]                  Abbey Alvarado   2017 10:00 AM   Office Visit    Description:  Female : 1945   Provider:  Steven Montgomery MD   Department:  [facility]           Reason for Visit     Atrial Fibrillation           Diagnoses this Visit        Comments    PAF (paroxysmal atrial fibrillation)    -  Primary     Hypertension associated with diabetes         Hypomagnesemia         Nonrheumatic aortic valve stenosis                To Do List           Goals (5 Years of Data)     None      Follow-Up and Disposition     Return in about 7 months (around 2017).       These Medications        Disp Refills Start End    metoprolol succinate (TOPROL-XL) 50 MG 24 hr tablet 90 tablet 3 2017    Take 1 tablet (50 mg total) by mouth every morning. - Oral    Pharmacy: [facility] [address] [place] Ph #: [phone]       amlodipine (NORVASC) 5 MG tablet 90 tablet 5 2017     Take 1 tablet (5 mg total) by mouth every evening. - Oral    Pharmacy: [facility] [place], [place] - [address] [place] Ph #: [phone]         [facility]     [facility] Nurse Care [facility] -  Assistance  Unless otherwise directed by your provider, please contact [facility] On-Call, our nurse care line that is available for  assistance.     Registered nurses in the [facility] provide: appointment scheduling, clinical advisement, health education, and other advisory services.  Call: [phone] (toll free)               Medications           Message regarding Medications     Verify the changes and/or additions to your medication regime listed below are the same as discussed with your clinician today.  If any of these changes or additions are incorrect, please notify your healthcare provider.        START taking these NEW medications         "Refills    amlodipine (NORVASC) 5 MG tablet 5    Sig: Take 1 tablet (5 mg total) by mouth every evening.    Class: Normal    Route: Oral      CHANGE how you are taking these medications     Start Taking Instead of    metoprolol succinate (TOPROL-XL) 50 MG 24 hr tablet metoprolol succinate (TOPROL-XL) 50 MG 24 hr tablet    Dosage:  Take 1 tablet (50 mg total) by mouth every morning. Dosage:  Take 1 tablet (50 mg total) by mouth every evening.    Reason for Change:  Reorder            Verify that the below list of medications is an accurate representation of the medications you are currently taking.  If none reported, the list may be blank. If incorrect, please contact your healthcare provider. Carry this list with you in case of emergency.           Current Medications     amlodipine (NORVASC) 5 MG tablet Take 1 tablet (5 mg total) by mouth every evening.    fluoxetine (PROZAC) 40 MG capsule Take 40 mg by mouth once daily.    hydrocodone-acetaminophen 10-325mg (NORCO)  mg Tab Take 1 tablet by mouth every 4 (four) hours as needed for Pain.    metoprolol succinate (TOPROL-XL) 50 MG 24 hr tablet Take 1 tablet (50 mg total) by mouth every morning.    omeprazole (PRILOSEC) 40 MG capsule Take 40 mg by mouth once daily.    rivaroxaban (XARELTO) 20 mg Tab Take 1 tablet (20 mg total) by mouth daily with dinner or evening meal.    estradiol (ESTRACE) 2 MG tablet Take 2 mg by mouth once daily.    lorazepam (ATIVAN) 0.5 MG tablet Take 0.5 mg by mouth every 6 (six) hours as needed for Anxiety.           Clinical Reference Information           Your Vitals Were     BP Pulse Height Weight Last Period BMI    120/63 61 5' 7" (1.702 m) 72.9 kg (160 lb 11.5 oz) (LMP Unknown) 25.17 kg/m2      Blood Pressure          Most Recent Value    BP  120/63      Allergies as of 4/19/2017     Percocet [Oxycodone-acetaminophen]    Ultram [Tramadol]      Immunizations Administered on Date of Encounter - 4/19/2017     None      Orders Placed " During Today's Visit     Future Labs/Procedures Expected by Expires    2D echo with color flow doppler  11/19/2017 4/20/2018    CBC auto differential  11/19/2017 6/18/2018    Comprehensive metabolic panel  11/19/2017 4/20/2018    Holter monitor - 48 hour  11/19/2017 4/20/2018    Lipid panel  11/19/2017 4/20/2018    T4, free  11/19/2017 4/20/2018    TSH  11/19/2017 4/20/2018      Language Assistance Services     ATTENTION: Language assistance services are available, free of charge. Please call [phone].      ATENCIÓN: Si habla español, tiene a nugent disposición servicios gratuitos de asistencia lingüística. Llame al [phone].     CHÚ Ý: N?u b?n nói Ti?ng Vi?t, có các d?ch v? h? tr? ngôn ng? mi?n phí dành cho b?n. G?i s? [phone].         [facility] complies with applicable Federal civil rights laws and does not discriminate on the basis of race, color, national origin, age, disability, or sex.

## 2017-11-10 ENCOUNTER — CLINICAL SUPPORT (OUTPATIENT)
Dept: CARDIOLOGY | Facility: CLINIC | Age: 72
End: 2017-11-10
Payer: MEDICARE

## 2017-11-10 ENCOUNTER — LAB VISIT (OUTPATIENT)
Dept: LAB | Facility: HOSPITAL | Age: 72
End: 2017-11-10
Attending: INTERNAL MEDICINE
Payer: MEDICARE

## 2017-11-10 DIAGNOSIS — E11.59 HYPERTENSION ASSOCIATED WITH DIABETES: ICD-10-CM

## 2017-11-10 DIAGNOSIS — E83.42 HYPOMAGNESEMIA: ICD-10-CM

## 2017-11-10 DIAGNOSIS — I48.0 PAF (PAROXYSMAL ATRIAL FIBRILLATION): ICD-10-CM

## 2017-11-10 DIAGNOSIS — I35.0 NONRHEUMATIC AORTIC VALVE STENOSIS: ICD-10-CM

## 2017-11-10 DIAGNOSIS — I15.2 HYPERTENSION ASSOCIATED WITH DIABETES: ICD-10-CM

## 2017-11-10 LAB
ALBUMIN SERPL BCP-MCNC: 2.7 G/DL
ALP SERPL-CCNC: 99 U/L
ALT SERPL W/O P-5'-P-CCNC: 11 U/L
ANION GAP SERPL CALC-SCNC: 8 MMOL/L
AST SERPL-CCNC: 14 U/L
BASOPHILS # BLD AUTO: 0.05 K/UL
BASOPHILS NFR BLD: 0.4 %
BILIRUB SERPL-MCNC: 0.2 MG/DL
BUN SERPL-MCNC: 16 MG/DL
CALCIUM SERPL-MCNC: 9.1 MG/DL
CHLORIDE SERPL-SCNC: 101 MMOL/L
CHOLEST SERPL-MCNC: 273 MG/DL
CHOLEST/HDLC SERPL: 3.5 {RATIO}
CO2 SERPL-SCNC: 28 MMOL/L
CREAT SERPL-MCNC: 0.8 MG/DL
DIFFERENTIAL METHOD: ABNORMAL
EOSINOPHIL # BLD AUTO: 0.1 K/UL
EOSINOPHIL NFR BLD: 0.8 %
ERYTHROCYTE [DISTWIDTH] IN BLOOD BY AUTOMATED COUNT: 12.9 %
EST. GFR  (AFRICAN AMERICAN): >60 ML/MIN/1.73 M^2
EST. GFR  (NON AFRICAN AMERICAN): >60 ML/MIN/1.73 M^2
GLUCOSE SERPL-MCNC: 104 MG/DL
HCT VFR BLD AUTO: 42.4 %
HDLC SERPL-MCNC: 79 MG/DL
HDLC SERPL: 28.9 %
HGB BLD-MCNC: 13.4 G/DL
IMM GRANULOCYTES # BLD AUTO: 0.14 K/UL
IMM GRANULOCYTES NFR BLD AUTO: 1.2 %
LDLC SERPL CALC-MCNC: 157.4 MG/DL
LYMPHOCYTES # BLD AUTO: 2.2 K/UL
LYMPHOCYTES NFR BLD: 19.4 %
MCH RBC QN AUTO: 30.1 PG
MCHC RBC AUTO-ENTMCNC: 31.6 G/DL
MCV RBC AUTO: 95 FL
MONOCYTES # BLD AUTO: 1 K/UL
MONOCYTES NFR BLD: 8.8 %
NEUTROPHILS # BLD AUTO: 8 K/UL
NEUTROPHILS NFR BLD: 69.4 %
NONHDLC SERPL-MCNC: 194 MG/DL
NRBC BLD-RTO: 0 /100 WBC
PLATELET # BLD AUTO: 366 K/UL
PMV BLD AUTO: 10.5 FL
POTASSIUM SERPL-SCNC: 4.3 MMOL/L
PROT SERPL-MCNC: 7.1 G/DL
RBC # BLD AUTO: 4.45 M/UL
SODIUM SERPL-SCNC: 137 MMOL/L
T4 FREE SERPL-MCNC: 1.09 NG/DL
TRIGL SERPL-MCNC: 183 MG/DL
TSH SERPL DL<=0.005 MIU/L-ACNC: 2.13 UIU/ML
WBC # BLD AUTO: 11.47 K/UL

## 2017-11-10 PROCEDURE — 36415 COLL VENOUS BLD VENIPUNCTURE: CPT | Mod: PO

## 2017-11-10 PROCEDURE — 84439 ASSAY OF FREE THYROXINE: CPT

## 2017-11-10 PROCEDURE — 80061 LIPID PANEL: CPT

## 2017-11-10 PROCEDURE — 80053 COMPREHEN METABOLIC PANEL: CPT

## 2017-11-10 PROCEDURE — 93306 TTE W/DOPPLER COMPLETE: CPT | Mod: PBBFAC,PO | Performed by: INTERNAL MEDICINE

## 2017-11-10 PROCEDURE — 84443 ASSAY THYROID STIM HORMONE: CPT

## 2017-11-10 PROCEDURE — 85025 COMPLETE CBC W/AUTO DIFF WBC: CPT

## 2017-11-10 PROCEDURE — 93226 XTRNL ECG REC<48 HR SCAN A/R: CPT | Mod: PBBFAC,PO | Performed by: INTERNAL MEDICINE

## 2017-11-10 PROCEDURE — 93227 XTRNL ECG REC<48 HR R&I: CPT | Mod: S$PBB,,, | Performed by: INTERNAL MEDICINE

## 2017-11-13 LAB
AORTIC VALVE REGURGITATION: ABNORMAL
AORTIC VALVE STENOSIS: ABNORMAL
ESTIMATED PA SYSTOLIC PRESSURE: 41.69
MITRAL VALVE REGURGITATION: ABNORMAL
RETIRED EF AND QEF - SEE NOTES: 55 (ref 55–65)
TRICUSPID VALVE REGURGITATION: ABNORMAL

## 2017-11-15 ENCOUNTER — OFFICE VISIT (OUTPATIENT)
Dept: CARDIOLOGY | Facility: CLINIC | Age: 72
End: 2017-11-15
Payer: MEDICARE

## 2017-11-15 VITALS
BODY MASS INDEX: 25.33 KG/M2 | DIASTOLIC BLOOD PRESSURE: 71 MMHG | HEART RATE: 59 BPM | WEIGHT: 161.38 LBS | HEIGHT: 67 IN | SYSTOLIC BLOOD PRESSURE: 140 MMHG

## 2017-11-15 DIAGNOSIS — I15.2 HYPERTENSION ASSOCIATED WITH DIABETES: ICD-10-CM

## 2017-11-15 DIAGNOSIS — I35.0 NONRHEUMATIC AORTIC VALVE STENOSIS: Primary | ICD-10-CM

## 2017-11-15 DIAGNOSIS — K27.9 PEPTIC ULCER DISEASE: ICD-10-CM

## 2017-11-15 DIAGNOSIS — E11.59 HYPERTENSION ASSOCIATED WITH DIABETES: ICD-10-CM

## 2017-11-15 DIAGNOSIS — E78.2 MIXED HYPERLIPIDEMIA: ICD-10-CM

## 2017-11-15 DIAGNOSIS — I48.0 PAF (PAROXYSMAL ATRIAL FIBRILLATION): ICD-10-CM

## 2017-11-15 DIAGNOSIS — E11.9 CONTROLLED TYPE 2 DIABETES MELLITUS WITHOUT COMPLICATION, WITHOUT LONG-TERM CURRENT USE OF INSULIN: ICD-10-CM

## 2017-11-15 PROCEDURE — 99212 OFFICE O/P EST SF 10 MIN: CPT | Mod: PBBFAC,PO | Performed by: INTERNAL MEDICINE

## 2017-11-15 PROCEDURE — 99213 OFFICE O/P EST LOW 20 MIN: CPT | Mod: S$PBB,,, | Performed by: INTERNAL MEDICINE

## 2017-11-15 PROCEDURE — 99999 PR PBB SHADOW E&M-EST. PATIENT-LVL II: CPT | Mod: PBBFAC,,, | Performed by: INTERNAL MEDICINE

## 2017-11-15 NOTE — PROGRESS NOTES
Subjective:    Patient ID:  Abbey Alvarado is a 71 y.o. female who presents for follow-up of Atrial Fibrillation (f/u - review echo, holter (in process) and labs ) and Hypertension      HPI  Here for follow up of  AF-RVR with DCCV/AS. Patients states is doing well no chest pain, SOB or change in exertional tolerence. Patient does not exercise but remains very active with out change in exertional tolerance or chest pain. Patient denies palpitations, syncope, presyncope, lightheadedness or dizziness.      Review of Systems   Constitution: Negative for malaise/fatigue.   Eyes: Negative for blurred vision.   Cardiovascular: Negative for chest pain, claudication, cyanosis, dyspnea on exertion, irregular heartbeat, leg swelling, near-syncope, orthopnea, palpitations, paroxysmal nocturnal dyspnea and syncope.   Respiratory: Negative for cough and shortness of breath.    Hematologic/Lymphatic: Does not bruise/bleed easily.   Musculoskeletal: Negative for back pain, falls, joint pain, muscle cramps, muscle weakness and myalgias.   Gastrointestinal: Negative for abdominal pain, change in bowel habit, nausea and vomiting.   Genitourinary: Negative for urgency.   Neurological: Negative for dizziness, focal weakness and light-headedness.        Objective:    Physical Exam   Constitutional: She is oriented to person, place, and time. She appears well-developed and well-nourished.   Neck: Normal range of motion. No JVD present.   Cardiovascular: Normal rate, regular rhythm, normal heart sounds and intact distal pulses.    Pulmonary/Chest: Effort normal and breath sounds normal.   Neurological: She is alert and oriented to person, place, and time.   Skin: Skin is warm and dry.   Psychiatric: She has a normal mood and affect.   Nursing note and vitals reviewed.            ..    Chemistry        Component Value Date/Time     11/10/2017 0710    K 4.3 11/10/2017 0710     11/10/2017 0710    CO2 28 11/10/2017 0710    BUN 16  11/10/2017 0710    CREATININE 0.8 11/10/2017 0710     11/10/2017 0710        Component Value Date/Time    CALCIUM 9.1 11/10/2017 0710    ALKPHOS 99 11/10/2017 0710    AST 14 11/10/2017 0710    ALT 11 11/10/2017 0710    BILITOT 0.2 11/10/2017 0710    ESTGFRAFRICA >60.0 11/10/2017 0710    EGFRNONAA >60.0 11/10/2017 0710            ..  Lab Results   Component Value Date    CHOL 273 (H) 11/10/2017    CHOL 202 (H) 02/28/2017    CHOL 197 01/16/2017     Lab Results   Component Value Date    HDL 79 (H) 11/10/2017    HDL 54 02/28/2017    HDL 47 01/16/2017     Lab Results   Component Value Date    LDLCALC 157.4 11/10/2017    LDLCALC 102.2 02/28/2017    LDLCALC 121.4 01/16/2017     Lab Results   Component Value Date    TRIG 183 (H) 11/10/2017    TRIG 229 (H) 02/28/2017    TRIG 143 01/16/2017     Lab Results   Component Value Date    CHOLHDL 28.9 11/10/2017    CHOLHDL 26.7 02/28/2017    CHOLHDL 23.9 01/16/2017     ..  Lab Results   Component Value Date    WBC 11.47 11/10/2017    HGB 13.4 11/10/2017    HCT 42.4 11/10/2017    MCV 95 11/10/2017     (H) 11/10/2017       Test(s) Reviewed  I have reviewed the following in detail:  [] Stress test   [] Angiography   [x] Echocardiogram   [x] Labs   [] Other:       Assessment:         ICD-10-CM ICD-9-CM   1. Nonrheumatic aortic valve stenosis I35.0 424.1   2. PAF (paroxysmal atrial fibrillation) I48.0 427.31   3. Hypertension associated with diabetes E11.59 250.80    I10 401.9   4. Peptic ulcer disease K27.9 533.90   5. Controlled type 2 diabetes mellitus without complication, without long-term current use of insulin E11.9 250.00     Problem List Items Addressed This Visit     AS (aortic stenosis) - Primary    Overview     2/17 Moderate to severe aortic stenosis, MARTI = 0.97 cm2, peak velocity = 2.5 m/s.          Controlled type 2 diabetes mellitus without complication    Hypertension associated with diabetes    PAF (paroxysmal atrial fibrillation)    Overview     2/17  DCCV         Peptic ulcer disease           Plan:           Return to clinic 6 month  Low level/low impact aerobic exercise 5x's/wk. Heart healthy diet and risk factor modification.    See labs and med orders.  Lipid in 6 montth  Await holter results  Advised patient to purchase Kardia key to monitor rhythm/PAF at home using cell phone.   If NSR then dc NOAC and use ASA  CFD next year follow AS

## 2018-03-27 ENCOUNTER — TELEPHONE (OUTPATIENT)
Dept: ENDOCRINOLOGY | Facility: CLINIC | Age: 73
End: 2018-03-27

## 2018-03-27 NOTE — TELEPHONE ENCOUNTER
----- Message from Adin Jaime MD sent at 3/23/2018  5:49 PM CDT -----  Regarding: Consult request  Kindly assist with setting up a new patient appointment for this patient referred by Dr Hensley.  Thanks    Adin Jaime MD

## 2018-03-28 NOTE — TELEPHONE ENCOUNTER
----- Message from Shelldanilo Faulkner sent at 3/28/2018  2:18 PM CDT -----  Patient states that she has scheduled a new patient appointment but need to see the doctor before the 8-24 appointment.  Please call patient at [phone].

## 2018-04-03 ENCOUNTER — OFFICE VISIT (OUTPATIENT)
Dept: ENDOCRINOLOGY | Facility: CLINIC | Age: 73
End: 2018-04-03
Payer: MEDICARE

## 2018-04-03 ENCOUNTER — LAB VISIT (OUTPATIENT)
Dept: LAB | Facility: HOSPITAL | Age: 73
End: 2018-04-03
Attending: INTERNAL MEDICINE
Payer: MEDICARE

## 2018-04-03 VITALS
HEART RATE: 65 BPM | TEMPERATURE: 98 F | RESPIRATION RATE: 16 BRPM | SYSTOLIC BLOOD PRESSURE: 120 MMHG | WEIGHT: 155.44 LBS | HEIGHT: 67 IN | DIASTOLIC BLOOD PRESSURE: 62 MMHG | BODY MASS INDEX: 24.4 KG/M2

## 2018-04-03 DIAGNOSIS — E04.9 GOITER: ICD-10-CM

## 2018-04-03 DIAGNOSIS — E55.9 HYPOVITAMINOSIS D: ICD-10-CM

## 2018-04-03 DIAGNOSIS — E06.9 THYROIDITIS: ICD-10-CM

## 2018-04-03 DIAGNOSIS — T46.2X4S AMIODARONE TOXICITY, UNDETERMINED INTENT, SEQUELA: ICD-10-CM

## 2018-04-03 DIAGNOSIS — I15.2 HYPERTENSION ASSOCIATED WITH DIABETES: ICD-10-CM

## 2018-04-03 DIAGNOSIS — E07.9 THYROID DISEASE: Primary | ICD-10-CM

## 2018-04-03 DIAGNOSIS — E03.9 HYPOTHYROIDISM (ACQUIRED): Primary | ICD-10-CM

## 2018-04-03 DIAGNOSIS — Z72.820 SLEEP DEPRIVATION: ICD-10-CM

## 2018-04-03 DIAGNOSIS — E07.9 THYROID DISEASE: ICD-10-CM

## 2018-04-03 DIAGNOSIS — E78.2 MIXED HYPERLIPIDEMIA: ICD-10-CM

## 2018-04-03 DIAGNOSIS — R94.6 THYROID FUNCTION TEST ABNORMAL: ICD-10-CM

## 2018-04-03 DIAGNOSIS — E11.65 TYPE 2 DIABETES MELLITUS WITH HYPERGLYCEMIA, WITHOUT LONG-TERM CURRENT USE OF INSULIN: ICD-10-CM

## 2018-04-03 DIAGNOSIS — F32.A DEPRESSION, UNSPECIFIED DEPRESSION TYPE: ICD-10-CM

## 2018-04-03 DIAGNOSIS — E11.59 HYPERTENSION ASSOCIATED WITH DIABETES: ICD-10-CM

## 2018-04-03 DIAGNOSIS — I25.10 ATHEROSCLEROSIS OF NATIVE CORONARY ARTERY WITHOUT ANGINA PECTORIS, UNSPECIFIED WHETHER NATIVE OR TRANSPLANTED HEART: ICD-10-CM

## 2018-04-03 DIAGNOSIS — I48.0 PAF (PAROXYSMAL ATRIAL FIBRILLATION): ICD-10-CM

## 2018-04-03 DIAGNOSIS — E78.00 HYPERCHOLESTEROLEMIA: ICD-10-CM

## 2018-04-03 DIAGNOSIS — K27.9 PEPTIC ULCER DISEASE: ICD-10-CM

## 2018-04-03 DIAGNOSIS — Z78.0 POSTMENOPAUSAL: ICD-10-CM

## 2018-04-03 PROBLEM — T46.2X1A AMIODARONE TOXICITY: Status: ACTIVE | Noted: 2018-04-03

## 2018-04-03 LAB
25(OH)D3+25(OH)D2 SERPL-MCNC: 54 NG/ML
ALBUMIN SERPL BCP-MCNC: 3.3 G/DL
ALP SERPL-CCNC: 103 U/L
ALT SERPL W/O P-5'-P-CCNC: 23 U/L
ANION GAP SERPL CALC-SCNC: 11 MMOL/L
AST SERPL-CCNC: 29 U/L
BASOPHILS # BLD AUTO: 0.05 K/UL
BASOPHILS NFR BLD: 0.5 %
BILIRUB SERPL-MCNC: 0.4 MG/DL
BUN SERPL-MCNC: 10 MG/DL
CA-I BLDV-SCNC: 1.17 MMOL/L
CALCIUM SERPL-MCNC: 9.4 MG/DL
CHLORIDE SERPL-SCNC: 101 MMOL/L
CHOLEST SERPL-MCNC: 246 MG/DL
CHOLEST/HDLC SERPL: 3.1 {RATIO}
CO2 SERPL-SCNC: 26 MMOL/L
CREAT SERPL-MCNC: 0.9 MG/DL
CRP SERPL-MCNC: 12.07 MG/L
DIFFERENTIAL METHOD: ABNORMAL
EOSINOPHIL # BLD AUTO: 0 K/UL
EOSINOPHIL NFR BLD: 0.2 %
ERYTHROCYTE [DISTWIDTH] IN BLOOD BY AUTOMATED COUNT: 14.2 %
ERYTHROCYTE [SEDIMENTATION RATE] IN BLOOD BY WESTERGREN METHOD: 21 MM/HR
EST. GFR  (AFRICAN AMERICAN): >60 ML/MIN/1.73 M^2
EST. GFR  (NON AFRICAN AMERICAN): >60 ML/MIN/1.73 M^2
ESTIMATED AVG GLUCOSE: 108 MG/DL
GLUCOSE SERPL-MCNC: 93 MG/DL
HBA1C MFR BLD HPLC: 5.4 %
HCT VFR BLD AUTO: 39.5 %
HDLC SERPL-MCNC: 79 MG/DL
HDLC SERPL: 32.1 %
HGB BLD-MCNC: 11.5 G/DL
IMM GRANULOCYTES # BLD AUTO: 0.06 K/UL
IMM GRANULOCYTES NFR BLD AUTO: 0.6 %
LDLC SERPL CALC-MCNC: 139.2 MG/DL
LYMPHOCYTES # BLD AUTO: 1.5 K/UL
LYMPHOCYTES NFR BLD: 15.5 %
MCH RBC QN AUTO: 25.8 PG
MCHC RBC AUTO-ENTMCNC: 29.1 G/DL
MCV RBC AUTO: 89 FL
MONOCYTES # BLD AUTO: 0.7 K/UL
MONOCYTES NFR BLD: 7.7 %
NEUTROPHILS # BLD AUTO: 7.2 K/UL
NEUTROPHILS NFR BLD: 75.5 %
NONHDLC SERPL-MCNC: 167 MG/DL
NRBC BLD-RTO: 0 /100 WBC
PLATELET # BLD AUTO: 440 K/UL
PMV BLD AUTO: 9.9 FL
POTASSIUM SERPL-SCNC: 3.8 MMOL/L
PROT SERPL-MCNC: 7.3 G/DL
PTH-INTACT SERPL-MCNC: 136 PG/ML
RBC # BLD AUTO: 4.45 M/UL
SODIUM SERPL-SCNC: 138 MMOL/L
T3 SERPL-MCNC: 111 NG/DL
T4 FREE SERPL-MCNC: 1.06 NG/DL
THYROGLOB AB SERPL IA-ACNC: <4 IU/ML
THYROPEROXIDASE IGG SERPL-ACNC: <6 IU/ML
TRIGL SERPL-MCNC: 139 MG/DL
TSH SERPL DL<=0.005 MIU/L-ACNC: 4.8 UIU/ML
URATE SERPL-MCNC: 5.3 MG/DL
WBC # BLD AUTO: 9.49 K/UL

## 2018-04-03 PROCEDURE — 84443 ASSAY THYROID STIM HORMONE: CPT

## 2018-04-03 PROCEDURE — 80375 DRUG/SUBSTANCE NOS 1-3: CPT

## 2018-04-03 PROCEDURE — 82306 VITAMIN D 25 HYDROXY: CPT

## 2018-04-03 PROCEDURE — 83520 IMMUNOASSAY QUANT NOS NONAB: CPT

## 2018-04-03 PROCEDURE — 85651 RBC SED RATE NONAUTOMATED: CPT | Mod: PO

## 2018-04-03 PROCEDURE — 84378 SUGARS SINGLE QUANT: CPT

## 2018-04-03 PROCEDURE — 82330 ASSAY OF CALCIUM: CPT

## 2018-04-03 PROCEDURE — 80061 LIPID PANEL: CPT

## 2018-04-03 PROCEDURE — 84480 ASSAY TRIIODOTHYRONINE (T3): CPT

## 2018-04-03 PROCEDURE — 83036 HEMOGLOBIN GLYCOSYLATED A1C: CPT | Mod: 59

## 2018-04-03 PROCEDURE — 84432 ASSAY OF THYROGLOBULIN: CPT

## 2018-04-03 PROCEDURE — 36415 COLL VENOUS BLD VENIPUNCTURE: CPT | Mod: PO

## 2018-04-03 PROCEDURE — 84550 ASSAY OF BLOOD/URIC ACID: CPT

## 2018-04-03 PROCEDURE — 99999 PR PBB SHADOW E&M-EST. PATIENT-LVL IV: CPT | Mod: PBBFAC,,, | Performed by: INTERNAL MEDICINE

## 2018-04-03 PROCEDURE — 82985 ASSAY OF GLYCATED PROTEIN: CPT

## 2018-04-03 PROCEDURE — 86376 MICROSOMAL ANTIBODY EACH: CPT

## 2018-04-03 PROCEDURE — 83970 ASSAY OF PARATHORMONE: CPT

## 2018-04-03 PROCEDURE — 86800 THYROGLOBULIN ANTIBODY: CPT | Mod: 91

## 2018-04-03 PROCEDURE — 84439 ASSAY OF FREE THYROXINE: CPT

## 2018-04-03 PROCEDURE — 99205 OFFICE O/P NEW HI 60 MIN: CPT | Mod: S$PBB,,, | Performed by: INTERNAL MEDICINE

## 2018-04-03 PROCEDURE — 99214 OFFICE O/P EST MOD 30 MIN: CPT | Mod: PBBFAC,PO | Performed by: INTERNAL MEDICINE

## 2018-04-03 PROCEDURE — 85025 COMPLETE CBC W/AUTO DIFF WBC: CPT

## 2018-04-03 PROCEDURE — 80299 QUANTITATIVE ASSAY DRUG: CPT

## 2018-04-03 PROCEDURE — 86141 C-REACTIVE PROTEIN HS: CPT

## 2018-04-03 PROCEDURE — 80053 COMPREHEN METABOLIC PANEL: CPT

## 2018-04-03 RX ORDER — METFORMIN HYDROCHLORIDE 500 MG/1
500 TABLET ORAL DAILY
COMMUNITY

## 2018-04-03 RX ORDER — LEVOTHYROXINE SODIUM 50 UG/1
50 TABLET ORAL DAILY
Qty: 90 TABLET | Refills: 3 | Status: SHIPPED | OUTPATIENT
Start: 2018-04-03 | End: 2018-04-05 | Stop reason: SDUPTHER

## 2018-04-03 RX ORDER — ARIPIPRAZOLE 10 MG/1
5 TABLET ORAL DAILY
COMMUNITY

## 2018-04-03 RX ORDER — VIT C/E/ZN/COPPR/LUTEIN/ZEAXAN 250MG-90MG
1000 CAPSULE ORAL DAILY
COMMUNITY

## 2018-04-03 RX ORDER — LISINOPRIL AND HYDROCHLOROTHIAZIDE 10; 12.5 MG/1; MG/1
1 TABLET ORAL DAILY
COMMUNITY

## 2018-04-03 NOTE — PROGRESS NOTES
Subjective:      Patient ID: Abbey Alvarado is a 72 y.o. female.    Chief Complaint:  thyroid disease    72 yr old postmenopausal lady with type 2 diabetes referred for initial care visit by her PCP, Dr Fuller on account of presumed thyroid functional disease.    History of Present Illness    Patient is a 72 yr old postmenopausal lady seen for initial care visit today on account of presumed thyroid functional disease.  Her associated background comorbidities are detailed below;    Patient Active Problem List   Diagnosis    Syncope    Laceration of head    Peptic ulcer disease    Hypertension associated with diabetes    Controlled type 2 diabetes mellitus without complication    Chest pain at rest    Weakness    Weakness of right lower extremity    Depression    Falls    Hypokalemia    Hypomagnesemia    Hypophosphatemia    Osteoarthritis of spine with radiculopathy, thoracolumbar region    Chronic low back pain with sciatica    PAF (paroxysmal atrial fibrillation)    AS (aortic stenosis)    Mixed hyperlipidemia    Type 2 diabetes mellitus with hyperglycemia    Hypovitaminosis D    Postmenopausal    Thyroid disease    Goiter    Thyroid function test abnormal    Thyroiditis    Hypercholesterolemia     Her current antidiabetic regimen is metformin 500mg QD. Her most recent HBA1c is 5.7 from 01/17.  Review of labs and records from [facility] (full details in the media tab section).  She apparently is on amiodarone 200mg Qd.Her most recent labs from [facility] showed a TSH of 8.23 and free T4 0.95 (borderline low).  Her ASCVD risk score is  The 10-year ASCVD risk score (Dagoberto TON Jr., et al., 2013) is: 23.1%    Values used to calculate the score:      Age: 72 years      Sex: Female      Is Non- : No      Diabetic: Yes      Tobacco smoker: No      Systolic Blood Pressure: 120 mmHg      Is BP treated: Yes      HDL Cholesterol: 79 mg/dL      Total  Cholesterol: 273 mg/dL    Patients baseline Mansfield score is 10. Patient had a sleep study from 2010 which was reportedly normal.  Patient had last DEXA within the last 2 years.  This was reportedly normal.   Patient had a fall just a week ago but had no major injuries.      Patient previously had a histiry of being on thyroid hormone repletion in 1990 for a short while but had been of it for many years now.  Patients mother also had hypothyroidism and was on thyroid hormone repletion.  Patient does SMBG every other day; the values are in the generally under 100.  Patient is s/p bilateral cataract removal; She sees Dr Bell @ [facility] in [place]. Patient last appt was April 2017. No retinopathy.  She has received her flu vaccination, pneumovax and shingles vaccinations.  Patient had been put on lovastatin but could not tolerate because of nausea.      Review of Systems   Constitutional: Positive for fatigue (chronic over the last ~ 1 yr; not progressive.). Negative for activity change, appetite change, diaphoresis and unexpected weight change.   HENT: Negative for facial swelling, hearing loss, sore throat, trouble swallowing and voice change.    Eyes: Positive for discharge. Negative for photophobia and visual disturbance.   Respiratory: Negative for cough, shortness of breath, wheezing and stridor.    Cardiovascular: Positive for palpitations (intermittent). Negative for chest pain and leg swelling.   Gastrointestinal: Negative for abdominal distention, abdominal pain, constipation, diarrhea and nausea.   Endocrine: Negative for cold intolerance, heat intolerance, polydipsia, polyphagia and polyuria.   Genitourinary: Negative for dysuria, frequency, menstrual problem (postmenopausal) and urgency.   Musculoskeletal: Negative for arthralgias, back pain, gait problem, myalgias, neck pain and neck stiffness.   Skin: Negative for color change, pallor and rash.   Neurological: Negative for  "dizziness, tremors, seizures, syncope, weakness, light-headedness, numbness and headaches.   Hematological: Negative for adenopathy. Bruises/bleeds easily.   Psychiatric/Behavioral: Positive for sleep disturbance (fragmented non restorative sleep.). Negative for agitation, confusion, dysphoric mood and hallucinations. The patient is not nervous/anxious.        Objective: /62 (BP Location: Right arm, Patient Position: Sitting, BP Method: Medium (Automatic))   Pulse 65   Temp 97.8 °F (36.6 °C) (Oral)   Resp 16   Ht 5' 7" (1.702 m)   Wt 70.5 kg (155 lb 6.8 oz)   LMP  (LMP Unknown)   BMI 24.34 kg/m²  Body surface area is 1.83 meters squared.  Waist circ; 38.5", hip circ; 39.75" waist to hip ratio; 0.97, neck circ;  14"       Physical Exam   Constitutional: She is oriented to person, place, and time. Vital signs are normal. She appears well-developed and well-nourished. She does not appear ill. No distress.   Pleasant elderly lady. Not pale, anicteric and afebrile. Well hydrated elderly lady. Clinically comfortable and not in any acute distress. Has mild thoracic spine kyphosis.   HENT:   Head: Normocephalic and atraumatic. Not macrocephalic. Head is without right periorbital erythema and without left periorbital erythema. Hair is normal.   Nose: Nose normal.   Mouth/Throat: Oropharynx is clear and moist. Mucous membranes are not pale and not dry. No oropharyngeal exudate.   Eyes: Conjunctivae, EOM and lids are normal. Pupils are equal, round, and reactive to light. Right eye exhibits no discharge. Left eye exhibits no discharge. No scleral icterus.   Neck: Trachea normal, normal range of motion, full passive range of motion without pain and phonation normal. Neck supple. Normal carotid pulses and no JVD present. Carotid bruit is not present. No tracheal deviation present. No thyroid mass and no thyromegaly present.   No nuchal AN. Mallampati grade 1 fauces.   Cardiovascular: Normal rate, regular rhythm, S1 " normal, S2 normal, intact distal pulses and normal pulses.  PMI is not displaced.  Exam reveals no gallop.    Murmur heard.   Systolic murmur is present with a grade of 3/6   Heart murmur is maximal over the cardiac base with minimal radiation to the neck and is chronic; she has had this since she was a young child and thinks she had rheumatic fever as a child.   Pulmonary/Chest: Effort normal and breath sounds normal. No respiratory distress. She has no wheezes. She has no rales.   Abdominal: Soft. Bowel sounds are normal. She exhibits no distension and no mass. There is no hepatosplenomegaly, splenomegaly or hepatomegaly. There is no tenderness. There is no rebound, no guarding and no CVA tenderness. No hernia. Hernia confirmed negative in the ventral area.   Musculoskeletal: She exhibits no edema or tenderness.        Right shoulder: She exhibits normal range of motion, no bony tenderness, no crepitus, no deformity, no pain, no spasm, normal pulse and normal strength.   No pedal edema and no calf swelling nor tenderness.   Lymphadenopathy:     She has no cervical adenopathy.   Neurological: She is alert and oriented to person, place, and time. She has normal strength and normal reflexes. She displays no atrophy, no tremor and normal reflexes. No cranial nerve deficit or sensory deficit. She exhibits normal muscle tone. Coordination and gait normal.   Skin: Skin is warm and dry. No bruising, no ecchymosis, no petechiae and no rash noted. She is not diaphoretic. No cyanosis or erythema. No pallor. Nails show no clubbing.   A few ecchymoses on upper and lower extremeties from recent fall.   Psychiatric: She has a normal mood and affect. Her speech is normal and behavior is normal. Judgment and thought content normal. Cognition and memory are normal.   Nursing note and vitals reviewed.      Lab Review:     Results for AMANUEL CASTLE (MRN [id number]) as of 4/3/2018 10:02   Ref. Range 11/10/2017 07:10 11/10/2017 07:45    WBC Latest Ref Range: 3.90 - 12.70 K/uL 11.47    RBC Latest Ref Range: 4.00 - 5.40 M/uL 4.45    Hemoglobin Latest Ref Range: 12.0 - 16.0 g/dL 13.4    Hematocrit Latest Ref Range: 37.0 - 48.5 % 42.4    MCV Latest Ref Range: 82 - 98 fL 95    MCH Latest Ref Range: 27.0 - 31.0 pg 30.1    MCHC Latest Ref Range: 32.0 - 36.0 g/dL 31.6 (L)    RDW Latest Ref Range: 11.5 - 14.5 % 12.9    Platelets Latest Ref Range: 150 - 350 K/uL 366 (H)    MPV Latest Ref Range: 9.2 - 12.9 fL 10.5    Gran% Latest Ref Range: 38.0 - 73.0 % 69.4    Gran # (ANC) Latest Ref Range: 1.8 - 7.7 K/uL 8.0 (H)    Immature Granulocytes Latest Ref Range: 0.0 - 0.5 % 1.2 (H)    Immature Grans (Abs) Latest Ref Range: 0.00 - 0.04 K/uL 0.14 (H)    Lymph% Latest Ref Range: 18.0 - 48.0 % 19.4    Lymph # Latest Ref Range: 1.0 - 4.8 K/uL 2.2    Mono% Latest Ref Range: 4.0 - 15.0 % 8.8    Mono # Latest Ref Range: 0.3 - 1.0 K/uL 1.0    Eosinophil% Latest Ref Range: 0.0 - 8.0 % 0.8    Eos # Latest Ref Range: 0.0 - 0.5 K/uL 0.1    Basophil% Latest Ref Range: 0.0 - 1.9 % 0.4    Baso # Latest Ref Range: 0.00 - 0.20 K/uL 0.05    nRBC Latest Ref Range: 0 /100 WBC 0    Sodium Latest Ref Range: 136 - 145 mmol/L 137    Potassium Latest Ref Range: 3.5 - 5.1 mmol/L 4.3    Chloride Latest Ref Range: 95 - 110 mmol/L 101    CO2 Latest Ref Range: 23 - 29 mmol/L 28    Anion Gap Latest Ref Range: 8 - 16 mmol/L 8    BUN, Bld Latest Ref Range: 8 - 23 mg/dL 16    Creatinine Latest Ref Range: 0.5 - 1.4 mg/dL 0.8    eGFR if non African American Latest Ref Range: >60 mL/min/1.73 m^2 >60.0    eGFR if African American Latest Ref Range: >60 mL/min/1.73 m^2 >60.0    Glucose Latest Ref Range: 70 - 110 mg/dL 104    Calcium Latest Ref Range: 8.7 - 10.5 mg/dL 9.1    Alkaline Phosphatase Latest Ref Range: 55 - 135 U/L 99    Total Protein Latest Ref Range: 6.0 - 8.4 g/dL 7.1    Albumin Latest Ref Range: 3.5 - 5.2 g/dL 2.7 (L)    Total Bilirubin Latest Ref Range: 0.1 - 1.0 mg/dL 0.2    AST Latest  Ref Range: 10 - 40 U/L 14    ALT Latest Ref Range: 10 - 44 U/L 11    Triglycerides Latest Ref Range: 30 - 150 mg/dL 183 (H)    Cholesterol Latest Ref Range: 120 - 199 mg/dL 273 (H)    HDL Latest Ref Range: 40 - 75 mg/dL 79 (H)    LDL Cholesterol Latest Ref Range: 63.0 - 159.0 mg/dL 157.4    Total Cholesterol/HDL Ratio Latest Ref Range: 2.0 - 5.0  3.5    TSH Latest Ref Range: 0.400 - 4.000 uIU/mL 2.126    Free T4 Latest Ref Range: 0.71 - 1.51 ng/dL 1.09        Assessment:     1. Thyroid disease  Anti-thyroglobulin antibody    Thyroid peroxidase antibody    Thyroglobulin    TSH    US Soft Tissue Head Neck Thyroid   2. Peptic ulcer disease  CBC auto differential   3. Mixed hyperlipidemia  Comprehensive metabolic panel    Lipid panel   4. PAF (paroxysmal atrial fibrillation)     5. Depression, unspecified depression type     6. Hypertension associated with diabetes  Uric acid    Urinalysis    Lipid panel    Fructosamine    GlycoMark (TM)    Hemoglobin A1c   7. Type 2 diabetes mellitus with hyperglycemia, without long-term current use of insulin  Comprehensive metabolic panel    Uric acid    Urinalysis    CBC auto differential    Microalbumin/creatinine urine ratio   8. Hypovitaminosis D  Vitamin D    PTH, intact    Calcium, ionized   9. Postmenopausal     10. Goiter  T4, free    T3    TSH    US Soft Tissue Head Neck Thyroid   11. Thyroid function test abnormal  T4, free    T3    Iodine, Serum    Interleukin-6    Sedimentation rate, manual    Amiodarone level   12. Thyroiditis  Anti-thyroglobulin antibody    Thyroid peroxidase antibody    T4, free    T3    Thyroglobulin    High sensitivity CRP (Cardiac CRP)    US Soft Tissue Head Neck Thyroid   13. 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 Atherosclerosis of native coronary  artery without angina pectoris, unspecified whether native or transplanted heart   High sensitivity CRP (Cardiac CRP)   16. Sleep deprivation        Regarding thyroid dysfunction; this is likely due to amiodarone and it appears based on her recent labs from [place] that she has hypothyroidism. Will confirm this and see is she has thyroid autoimmunity as well. Based on these results will then commence thyroid hormone repletion therapy.  To also obtain screening thyroid USS.  Regarding hyperlipidemia with hypercholesterolemia; to obtain lipd panel today. Will defer to her cardiologist regarding treatment alternatives as she was unable to tolerate lovastatin.  Regarding PAF; ongoing management including amiodarone and Xarelto use as per her cardiology team. To check serum amiodarone and OLLIE levels.  Regarding depression; mood stable. To continue present antidepressant regimen as before.  Regarding type 2 diabetes; glycemic control appears quite good. Will recheck HBA1c and no change to her present antidiabetic regimen is indicated at this time.  Regarding hypovitaminosis D; to continue vitamin D repletion therapy as before.  Regarding postmenopausal state; fall prevention guidelines discussed. To retrieve her most recent DEXA and will repeat DEXA screening in ~ 2 years.  Regarding sleep deprivation/chronic fragmented sleep; advised to OTc melatonin supplements; 3-5mg QHs.  Regarding GERD; symptomatically stable; to continue PPI therapy as before.  I spent over 45 minutes discussing the management and investigation plan with the patient as well as in direct face to face counselling of the patient regarding her various endocrine and metabolic problems addressed during this visit.    Plan:       FFup in ~ 3mths

## 2018-04-03 NOTE — LETTER
April 3, 2018      Debo Fuller MD  [address]  [place] [address]           [place] [facility]  [address]  [place] [address]  Phone: [phone]          Patient: Abbey Alvarado   MR Number: [id number]   YOB: 1945   Date of Visit: 4/3/2018       Dear Dr. Debo Fuller:    Thank you for referring Abbey Alvarado to me for evaluation. Attached you will find relevant portions of my assessment and plan of care.    If you have questions, please do not hesitate to call me. I look forward to following Abbey Alvarado along with you.    Sincerely,    Adin Jaime MD    Enclosure  CC:  No Recipients    If you would like to receive this communication electronically, please contact [email or web address] or [phone] to request more information on [facility] access.    For providers and/or their staff who would like to refer a patient to [facility], please contact us through our one-stop-shop provider referral line, [facility], at [phone].    If you feel you have received this communication in error or would no longer like to receive these types of communications, please e-mail [email or web address]

## 2018-04-05 DIAGNOSIS — E03.9 HYPOTHYROIDISM (ACQUIRED): ICD-10-CM

## 2018-04-05 LAB
IODINE SERPL-MCNC: 2272 NG/ML (ref 40–92)
THRYOGLOBULIN INTERPRETATION: ABNORMAL
THYROGLOB AB SERPL-ACNC: <1.8 IU/ML
THYROGLOB SERPL-MCNC: 29 NG/ML

## 2018-04-05 NOTE — TELEPHONE ENCOUNTER
----- Message from Sonia Scott sent at 4/5/2018  3:02 PM CDT -----  Contact: self   Patient want to know if you still going to call her rx in if so please send to [facility], any questions please call back at [phone] (home)     [id number] - [place], [place] - [address] AT [facility]  [address]  [place] [address]  Phone: [phone] Fax: [phone]

## 2018-04-06 LAB
FRUCTOSAMINE SERPL-SCNC: 163 UMOL /L
IL6 SERPL-MCNC: 1.3 PG/ML

## 2018-04-06 RX ORDER — LEVOTHYROXINE SODIUM 50 UG/1
50 TABLET ORAL DAILY
Qty: 90 TABLET | Refills: 3 | Status: SHIPPED | OUTPATIENT
Start: 2018-04-06 | End: 2018-07-05

## 2018-04-07 LAB — GLYCOMARK (TM): 19.8 UG/ML

## 2018-04-09 LAB
AMIODARONE FLD-MCNC: 1.3 UG/ML (ref 1–3)
N-DESETHYL-AMIODARONE: 1.4 UG/ML

## 2018-04-11 LAB
MELATONIN RESULTING LIMIT: 1 NG/ML
MELATONIN SERPL-MCNC: NORMAL NG/ML

## 2018-05-12 DIAGNOSIS — I48.0 PAF (PAROXYSMAL ATRIAL FIBRILLATION): ICD-10-CM

## 2018-05-12 DIAGNOSIS — I15.2 HYPERTENSION ASSOCIATED WITH DIABETES: ICD-10-CM

## 2018-05-12 DIAGNOSIS — E11.59 HYPERTENSION ASSOCIATED WITH DIABETES: ICD-10-CM

## 2018-05-12 DIAGNOSIS — I35.0 NONRHEUMATIC AORTIC VALVE STENOSIS: ICD-10-CM

## 2018-05-12 DIAGNOSIS — E83.42 HYPOMAGNESEMIA: ICD-10-CM

## 2018-05-14 RX ORDER — METOPROLOL SUCCINATE 50 MG/1
TABLET, EXTENDED RELEASE ORAL
Qty: 180 TABLET | Refills: 4 | Status: SHIPPED | OUTPATIENT
Start: 2018-05-14

## 2018-08-24 PROBLEM — E03.9 HYPOTHYROIDISM (ACQUIRED): Status: ACTIVE | Noted: 2018-08-24

## 2018-08-24 PROBLEM — Z79.899 LONG TERM CURRENT USE OF AMIODARONE: Status: ACTIVE | Noted: 2018-08-24

## 2024-06-04 NOTE — IP AVS SNAPSHOT
[facility] Location ([facility])  [address] [place] [address]  Phone: [phone]           Patient Discharge Instructions     Our goal is to set you up for success. This packet includes information on your condition, medications, and your home care. It will help you to care for yourself so you don't get sicker and need to go back to the hospital.     Please ask your nurse if you have any questions.        There are many details to remember when preparing to leave the hospital. Here is what you will need to do:    1. Take your medicine. If you are prescribed medications, review your Medication List in the following pages. You may have new medications to  at the pharmacy and others that you'll need to stop taking. Review the instructions for how and when to take your medications. Talk with your doctor or nurses if you are unsure of what to do.     2. Go to your follow-up appointments. Specific follow-up information is listed in the following pages. Your may be contacted by a transition nurse or clinical provider about future appointments. Be sure we have all of the phone numbers to reach you, if needed. Please contact your provider's office if you are unable to make an appointment.     3. Watch for warning signs. Your doctor or nurse will give you detailed warning signs to watch for and when to call for assistance. These instructions may also include educational information about your condition. If you experience any of warning signs to your health, call your doctor.               ** Verify the list of medication(s) below is accurate and up to date. Carry this with you in case of emergency. If your medications have changed, please notify your healthcare provider.             Medication List      START taking these medications        Additional Info                      cyclobenzaprine 5 MG tablet   Commonly known as:  FLEXERIL   Quantity:  30 tablet   Refills:  0   Dose:  5 mg    Last time  this was given:  5 mg on 1/18/2017  4:08 PM   Instructions:  Take 1 tablet (5 mg total) by mouth 3 (three) times daily as needed for Muscle spasms.     Begin Date    AM    Noon    PM    Bedtime         CONTINUE taking these medications        Additional Info                      amlodipine 10 MG tablet   Commonly known as:  NORVASC   Refills:  0   Dose:  10 mg    Last time this was given:  10 mg on 1/20/2017  9:26 AM   Instructions:  Take 10 mg by mouth once daily.     Begin Date    AM    Noon    PM    Bedtime       duloxetine 20 MG capsule   Commonly known as:  CYMBALTA   Quantity:  60 capsule   Refills:  11   Dose:  20 mg    Last time this was given:  20 mg on 1/20/2017  9:26 AM   Instructions:  Take 1 capsule (20 mg total) by mouth 2 (two) times daily.     Begin Date    AM    Noon    PM    Bedtime       hydrocodone-acetaminophen 10-325mg  mg Tab   Commonly known as:  NORCO   Refills:  0   Dose:  1 tablet    Instructions:  Take 1 tablet by mouth every 4 (four) hours as needed for Pain.     Begin Date    AM    Noon    PM    Bedtime       omeprazole 40 MG capsule   Commonly known as:  PRILOSEC   Refills:  0   Dose:  40 mg    Instructions:  Take 40 mg by mouth once daily.     Begin Date    AM    Noon    PM    Bedtime         STOP taking these medications     clonazePAM 1 MG tablet   Commonly known as:  KLONOPIN       metformin 500 MG 24 hr tablet   Commonly known as:  GLUCOPHAGE-XR            Where to Get Your Medications      These medications were sent to [facility] [place], [place] - [address] [place]  [address] [facility] [place] [address]     Phone:  [phone]     cyclobenzaprine 5 MG tablet                  Please bring to all follow up appointments:    1. A copy of your discharge instructions.  2. All medicines you are currently taking in their original bottles.  3. Identification and insurance card.    Please arrive 15 minutes ahead of scheduled appointment time.    Please call 24 hours  in advance if you must reschedule your appointment and/or time.        Follow-up Information     Follow up with Debo Hensley MD. Schedule an appointment as soon as possible for a visit in 1 week.    Specialty:  Family Medicine    Contact information:    [address] [facility] [address] [facility]  [place] [address]  [phone]          Follow up with Gurinder López MD. Schedule an appointment as soon as possible for a visit in 3 days.    Specialty:  Orthopedic Surgery    Contact information:    [address] [address] [facility]  [place] [address]  [phone]        Referrals     Future Orders    Ambulatory consult to Orthopedics     Ambulatory consult to Pain Clinic         Discharge Instructions     Future Orders    Activity as tolerated     Call MD for:  difficulty breathing or increased cough     Call MD for:  increased confusion or weakness     Call MD for:  persistent dizziness, light-headedness, or visual disturbances     Call MD for:  persistent nausea and vomiting or diarrhea     Call MD for:  severe persistent headache     Call MD for:  severe uncontrolled pain     Call MD for:  temperature >100.4     Call MD for:  worsening rash     Diet Diabetic 1800 Calories         Discharge Instructions       Your feedback is important to us.  If you should receive a survey in the next few days, please share your experience with us.      Discharge References/Attachments     CYCLOBENZAPRINE HYDROCHLORIDE ORAL TABLET (ENGLISH)        Primary Diagnosis     Your primary diagnosis was:  Osteoarthritis Of Spine With Radiculopathy, Thoracolumbar Region      Admission Information     Date & Time Provider Department CSN    1/15/2017  9:03 PM Nilo Layton MD [facility] [id number]      Care Providers     Provider Role Specialty Primary office phone    Nilo Layton MD Attending Provider Hospitalist [phone]    Ramses Casey MD Consulting Physician  Neurology [phone]    Darryl  "MD Michelle Consulting Physician  Orthopedic Surgery [phone]    Bill Bryan MD Consulting Physician  Orthopedic Surgery  [phone]      Important Medicare Message          Most Recent Value    Important Message from Medicare Regarding Discharge Appeal Rights  Given to patient/caregiver, Explained to patient/caregiver, Signed/date by patient/caregiver yes 01/19/2017 0643      Your Vitals Were     BP Pulse Temp Resp Height Weight    120/63 (BP Location: Left arm, Patient Position: Sitting, BP Method: Automatic) 68 97.6 °F (36.4 °C) (Oral) 16 5' 7" (1.702 m) 76.7 kg (169 lb 1.5 oz)    SpO2 BMI             95% 26.48 kg/m2         Recent Lab Values        1/16/2017                           1:47 PM           A1C 5.7           Comment for A1C at  1:47 PM on 1/16/2017:  According to ADA guidelines, hemoglobin A1C <7.0% represents  optimal control in non-pregnant diabetic patients.  Different  metrics may apply to specific populations.   Standards of Medical Care in Diabetes - 2016.  For the purpose of screening for the presence of diabetes:  <5.7%     Consistent with the absence of diabetes  5.7-6.4%  Consistent with increasing risk for diabetes   (prediabetes)  >or=6.5%  Consistent with diabetes  Currently no consensus exists for use of hemoglobin A1C  for diagnosis of diabetes for children.        Allergies as of 1/20/2017        Reactions    Percocet [Oxycodone-acetaminophen]     Ultram [Tramadol]       [facility]     [facility] Nurse Care Line - 24/7 Assistance  Unless otherwise directed by your provider, please contact [facility], our nurse care line that is available for 24/7 assistance.     Registered nurses in the [facility] provide clinical advisement, health education, appointment booking, and other advisory services.  Call for this free service at [phone].        Advance Directives     An advance directive is a document which, in the event you are no longer " able to make decisions for yourself, tells your healthcare team what kind of treatment you do or do not want to receive, or who you would like to make those decisions for you.  If you do not currently have an advance directive, [facility] encourages you to create one.  For more information call:  [phone]), [phone] ([phone]),  or log on to [email or web address].        Language Assistance Services     ATTENTION: Language assistance services are available, free of charge. Please call [phone].      ATENCIÓN: Si habla español, tiene a nugent disposición servicios gratuitos de asistencia lingüística. Llame al [phone].     CHÚ Ý: N?u b?n nói Ti?ng Vi?t, có các d?ch v? h? tr? ngôn ng? mi?n phí dành cho b?n. G?i s? [phone].        Diabetes Discharge Instructions                                   [facility] Sign-Up     Activating your [facility] account is as easy as 1-2-3!     1) Visit [email or web address], select Sign Up Now, enter this activation code and your date of birth, then select Next.  [email or web address]  Expires: 2/6/2017 11:36 AM      2) Create a username and password to use when you visit [facility] in the future and select a security question in case you lose your password and select Next.    3) Enter your e-mail address and click Sign Up!    Additional Information  If you have questions, please e-mail [email or web address] or call [phone] to talk to our [facility] staff. Remember, [facility] is NOT to be used for urgent needs. For medical emergencies, dial 911.          [facility] complies with applicable Federal civil rights laws and does not discriminate on the basis of race, color, national origin, age, disability, or sex.         denies